# Patient Record
Sex: FEMALE | Race: WHITE | Employment: FULL TIME | URBAN - METROPOLITAN AREA
[De-identification: names, ages, dates, MRNs, and addresses within clinical notes are randomized per-mention and may not be internally consistent; named-entity substitution may affect disease eponyms.]

---

## 2017-10-13 ENCOUNTER — TELEPHONE (OUTPATIENT)
Dept: FAMILY MEDICINE CLINIC | Age: 67
End: 2017-10-13

## 2017-10-13 DIAGNOSIS — E78.5 HYPERLIPIDEMIA, UNSPECIFIED HYPERLIPIDEMIA TYPE: Primary | ICD-10-CM

## 2017-11-25 RX ORDER — CITALOPRAM 10 MG/1
TABLET ORAL
Qty: 30 TABLET | Refills: 0 | Status: SHIPPED | OUTPATIENT
Start: 2017-11-25 | End: 2017-11-27 | Stop reason: SDUPTHER

## 2017-11-27 ENCOUNTER — OFFICE VISIT (OUTPATIENT)
Dept: FAMILY MEDICINE CLINIC | Age: 67
End: 2017-11-27

## 2017-11-27 VITALS
OXYGEN SATURATION: 98 % | HEIGHT: 66 IN | WEIGHT: 161 LBS | HEART RATE: 75 BPM | SYSTOLIC BLOOD PRESSURE: 112 MMHG | BODY MASS INDEX: 25.88 KG/M2 | DIASTOLIC BLOOD PRESSURE: 66 MMHG

## 2017-11-27 DIAGNOSIS — Z23 NEED FOR PROPHYLACTIC VACCINATION WITH TETANUS-DIPHTHERIA (TD): ICD-10-CM

## 2017-11-27 DIAGNOSIS — Z00.00 ROUTINE GENERAL MEDICAL EXAMINATION AT A HEALTH CARE FACILITY: Primary | ICD-10-CM

## 2017-11-27 PROCEDURE — G0438 PPPS, INITIAL VISIT: HCPCS | Performed by: FAMILY MEDICINE

## 2017-11-27 RX ORDER — CITALOPRAM 10 MG/1
TABLET ORAL
Qty: 30 TABLET | Refills: 11 | Status: SHIPPED | OUTPATIENT
Start: 2017-11-27 | End: 2019-05-24 | Stop reason: SDUPTHER

## 2017-11-27 RX ORDER — TETANUS AND DIPHTHERIA TOXOIDS ADSORBED 2; 2 [LF]/.5ML; [LF]/.5ML
0.5 INJECTION INTRAMUSCULAR ONCE
Qty: 0.5 ML | Refills: 0 | Status: SHIPPED | OUTPATIENT
Start: 2017-11-27 | End: 2017-11-27

## 2017-11-27 ASSESSMENT — LIFESTYLE VARIABLES
HOW OFTEN DURING THE LAST YEAR HAVE YOU HAD A FEELING OF GUILT OR REMORSE AFTER DRINKING: 0
HOW OFTEN DO YOU HAVE A DRINK CONTAINING ALCOHOL: 1
HOW OFTEN DO YOU HAVE SIX OR MORE DRINKS ON ONE OCCASION: 1
HAVE YOU OR SOMEONE ELSE BEEN INJURED AS A RESULT OF YOUR DRINKING: 0
AUDIT-C TOTAL SCORE: 3
HAS A RELATIVE, FRIEND, DOCTOR, OR ANOTHER HEALTH PROFESSIONAL EXPRESSED CONCERN ABOUT YOUR DRINKING OR SUGGESTED YOU CUT DOWN: 0
HOW MANY STANDARD DRINKS CONTAINING ALCOHOL DO YOU HAVE ON A TYPICAL DAY: 1
HOW OFTEN DURING THE LAST YEAR HAVE YOU BEEN UNABLE TO REMEMBER WHAT HAPPENED THE NIGHT BEFORE BECAUSE YOU HAD BEEN DRINKING: 0
HOW OFTEN DURING THE LAST YEAR HAVE YOU NEEDED AN ALCOHOLIC DRINK FIRST THING IN THE MORNING TO GET YOURSELF GOING AFTER A NIGHT OF HEAVY DRINKING: 0
HOW OFTEN DURING THE LAST YEAR HAVE YOU FAILED TO DO WHAT WAS NORMALLY EXPECTED FROM YOU BECAUSE OF DRINKING: 0

## 2017-11-27 ASSESSMENT — PATIENT HEALTH QUESTIONNAIRE - PHQ9
1. LITTLE INTEREST OR PLEASURE IN DOING THINGS: 0
SUM OF ALL RESPONSES TO PHQ QUESTIONS 1-9: 0
2. FEELING DOWN, DEPRESSED OR HOPELESS: 0
SUM OF ALL RESPONSES TO PHQ9 QUESTIONS 1 & 2: 0

## 2017-11-27 ASSESSMENT — ANXIETY QUESTIONNAIRES: GAD7 TOTAL SCORE: 2

## 2017-11-27 NOTE — PROGRESS NOTES
Medicare Annual Wellness Visit  Name: Reynaldo Adame Date: 2017   MRN: [id number] Sex: Female   Age: 78 y.o. Ethnicity: Non-/Non    : 1950 Race: Ana Part is here for Medicare AWV    Screenings for behavioral, psychosocial and functional/safety risks, and cognitive dysfunction are all negative except as indicated below. These results, as well as other patient data from the [address] form, are documented in Flowsheets linked to this Encounter. Allergies   Allergen Reactions    Pneumococcal Vaccines Other (See Comments)     Large local reaction    Prednisone Palpitations     dizziness     Prior to Visit Medications    Medication Sig Taking? Authorizing Provider   citalopram (CELEXA) 10 MG tablet TAKE ONE TABLET BY MOUTH DAILY Yes Hanna Bradley MD     No past medical history on file. No past surgical history on file.   Family History   Problem Relation Age of Onset    High Cholesterol Mother     Other Father      colon polyps    Other Sister      gerd       CareTeam (Including outside providers/suppliers regularly involved in providing care):   Patient Care Team:  Hanna Bradley MD as PCP - General (Family Medicine)  Hanna Bradley MD as PCP - S Attributed Provider  Josh Bruce MD as Consulting Physician (Obstetrics & Gynecology)    Wt Readings from Last 3 Encounters:   17 161 lb (73 kg)   16 163 lb (73.9 kg)   07/08/15 156 lb (70.8 kg)     Vitals:    17 0734   BP: 112/66   Site: Right Arm   Position: Sitting   Cuff Size: Large Adult   Pulse: 75   SpO2: 98%   Weight: 161 lb (73 kg)   Height: 5' 5.75\" (1.67 m)       General Appearance: alert and oriented to person, place and time, well developed and well- nourished, in no acute distress  Head: normocephalic and atraumatic  Eyes: pupils equal, round, and reactive to light, extraocular eye movements intact, conjunctivae normal  ENT: tympanic membrane, external ear and ear canal normal bilaterally, nose without deformity, nasal mucosa and turbinates normal without polyps  Neck: supple and non-tender without mass, no thyromegaly or thyroid nodules, no cervical lymphadenopathy  Pulmonary/Chest: clear to auscultation bilaterally- no wheezes, rales or rhonchi, normal air movement, no respiratory distress  Cardiovascular: normal rate, regular rhythm, normal S1 and S2, no murmurs, rubs, clicks, or gallops, distal pulses intact, no carotid bruits  Abdomen: soft, non-tender, non-distended, normal bowel sounds, no masses or organomegaly  Extremities: no cyanosis, clubbing or edema  Musculoskeletal: normal range of motion, no joint swelling, deformity or tenderness  Neurologic: reflexes normal and symmetric, no cranial nerve deficit, gait, coordination and speech normal  Some freckles and SK  Patient's complete Health Risk Assessment and screening values have been reviewed and are found in Flowsheets. The following problems were reviewed today and where indicated follow up appointments were made and/or referrals ordered. Positive Risk Factor Screenings with Interventions:     General Health:  General  In general, how would you say your health is?: Very Good  In the past 7 days, have you experienced any of the following?: (!) Stress, work  Do you get the social and emotional support that you need?: Yes  Do you have a Living Will?: Yes  General Health Risk Interventions:  · None indicated    Health Habits/Nutrition:  Health Habits/Nutrition  Do you exercise for at least 20 minutes 2-3 times per week?: Yes  Have you lost any weight without trying in the past 3 months?: No  Do you eat fewer than 2 meals per day?: No  Have you seen a dentist within the past year?: (!) No  Body mass index is 26.18 kg/m².   Health Habits/Nutrition Interventions:  · None indicated      Personalized Preventive Plan   Current Health Maintenance Status  Immunization History   Administered Date(s) Administered    Influenza, High Dose 11/04/2016, 11/13/2017    Pneumococcal 13-valent Conjugate ([id number]) 11/04/2016    Zoster 01/03/2012        Health Maintenance   Topic Date Due    Hepatitis C screen  1950    DTaP/Tdap/Td vaccine (1 - Tdap) 02/11/1970    Colon cancer screen colonoscopy  01/01/2018    Breast cancer screen  03/16/2019    Lipid screen  11/16/2022    Zostavax vaccine  Completed    DEXA (modify frequency per FRAX score)  Completed    Flu vaccine  Completed     Recommendations for Preventive Services Due: see orders.   Recommended screening schedule for the next 5-10 years is provided to the patient in written form: see Patient Instructions/AVS.

## 2017-11-27 NOTE — PATIENT INSTRUCTIONS
Personalized Preventive Plan for Ary Loco - 11/27/2017  Medicare offers a range of preventive health benefits. Some of the tests and screenings are paid in full while other may be subject to a deductible, co-insurance, and/or copay. Some of these benefits include a comprehensive review of your medical history including lifestyle, illnesses that may run in your family, and various assessments and screenings as appropriate. After reviewing your medical record and screening and assessments performed today your provider may have ordered immunizations, labs, imaging, and/or referrals for you. A list of these orders (if applicable) as well as your Preventive Care list are included within your After Visit Summary for your review. Other Preventive Recommendations:    · A preventive eye exam performed by an eye specialist is recommended every 1-2 years to screen for glaucoma; cataracts, macular degeneration, and other eye disorders. · A preventive dental visit is recommended every 6 months. · Try to get at least 150 minutes of exercise per week or 10,000 steps per day on a pedometer . · Order or download the FREE \"Exercise & Physical Activity: Your Everyday Guide\" from [employer]. Call 8-118.476.3429 or search [employer] online. · You need 5108-2881 mg of calcium and 1305-7523 IU of vitamin D per day. It is possible to meet your calcium requirement with diet alone, but a vitamin D supplement is usually necessary to meet this goal.  · When exposed to the sun, use a sunscreen that protects against both UVA and UVB radiation with an SPF of 30 or greater. Reapply every 2 to 3 hours or after sweating, drying off with a towel, or swimming. · Always wear a seat belt when traveling in a car. Always wear a helmet when riding a bicycle or motorcycle.

## 2018-09-20 ENCOUNTER — TELEPHONE (OUTPATIENT)
Dept: FAMILY MEDICINE CLINIC | Age: 68
End: 2018-09-20

## 2018-09-25 ENCOUNTER — TELEPHONE (OUTPATIENT)
Dept: FAMILY MEDICINE CLINIC | Age: 68
End: 2018-09-25

## 2018-10-23 ENCOUNTER — TELEPHONE (OUTPATIENT)
Dept: FAMILY MEDICINE CLINIC | Age: 68
End: 2018-10-23

## 2018-10-23 DIAGNOSIS — E78.5 HYPERLIPIDEMIA, UNSPECIFIED HYPERLIPIDEMIA TYPE: Primary | ICD-10-CM

## 2018-11-28 ENCOUNTER — OFFICE VISIT (OUTPATIENT)
Dept: FAMILY MEDICINE CLINIC | Age: 68
End: 2018-11-28
Payer: COMMERCIAL

## 2018-11-28 VITALS
WEIGHT: 162 LBS | SYSTOLIC BLOOD PRESSURE: 120 MMHG | DIASTOLIC BLOOD PRESSURE: 70 MMHG | HEIGHT: 66 IN | BODY MASS INDEX: 26.03 KG/M2

## 2018-11-28 DIAGNOSIS — F41.9 ANXIETY: Primary | ICD-10-CM

## 2018-11-28 DIAGNOSIS — E78.00 PURE HYPERCHOLESTEROLEMIA: ICD-10-CM

## 2018-11-28 DIAGNOSIS — Z00.00 PHYSICAL EXAM: ICD-10-CM

## 2018-11-28 PROCEDURE — 99397 PER PM REEVAL EST PAT 65+ YR: CPT | Performed by: FAMILY MEDICINE

## 2018-11-28 RX ORDER — CITALOPRAM 10 MG/1
10 TABLET ORAL DAILY
Qty: 30 TABLET | Refills: 5 | Status: SHIPPED | OUTPATIENT
Start: 2018-11-28 | End: 2019-05-24

## 2018-11-28 ASSESSMENT — ENCOUNTER SYMPTOMS
RHINORRHEA: 0
WHEEZING: 0
NAUSEA: 0
TROUBLE SWALLOWING: 0
VOMITING: 0
ABDOMINAL PAIN: 0
SHORTNESS OF BREATH: 0
CONSTIPATION: 1
COUGH: 0
DIARRHEA: 0
CHEST TIGHTNESS: 0
BACK PAIN: 1
EYE PAIN: 0

## 2018-11-28 ASSESSMENT — PATIENT HEALTH QUESTIONNAIRE - PHQ9
SUM OF ALL RESPONSES TO PHQ QUESTIONS 1-9: 0
2. FEELING DOWN, DEPRESSED OR HOPELESS: 0
SUM OF ALL RESPONSES TO PHQ9 QUESTIONS 1 & 2: 0
1. LITTLE INTEREST OR PLEASURE IN DOING THINGS: 0
SUM OF ALL RESPONSES TO PHQ QUESTIONS 1-9: 0

## 2019-03-25 NOTE — PROGRESS NOTES
Patient not reached. Preop instructions left on voice mail. Number__[phone]_______      DATE__4/4/19______ TIME___0830_____ARRIVAL__FEC  0700_______      Nothing to eat or drink after midnight the night before,except for what the prep instructions call for. If you do not have the instructions or do not understand them please contact your doctors office. Follow any instructions your doctors office has given you including what medications to take the AM of your procedure and which ones to hold. You may use your inhalers. If you take a long acting insulin the kaveh prior please cut the dose in half and take no diabetic medications that AM.Follow specific doctors office instructions regarding blood thinners and if they want you to hold and for how long. If you are on a blood thinner and have no instructions please contact the office and ask. Dress comfortably,bring your insurance card,picture ID,and a complete list of medications, including supplements. You must have a responsible adult to stay with you during the procedure,drive you home and stay with you. [facility] phone number [phone] for any questions.

## 2019-03-26 ENCOUNTER — ANESTHESIA EVENT (OUTPATIENT)
Dept: ENDOSCOPY | Age: 69
End: 2019-03-26
Payer: MEDICARE

## 2019-04-04 ENCOUNTER — ANESTHESIA (OUTPATIENT)
Dept: ENDOSCOPY | Age: 69
End: 2019-04-04
Payer: MEDICARE

## 2019-04-04 ENCOUNTER — HOSPITAL ENCOUNTER (OUTPATIENT)
Age: 69
Setting detail: OUTPATIENT SURGERY
Discharge: HOME | End: 2019-04-04
Attending: INTERNAL MEDICINE | Admitting: INTERNAL MEDICINE
Payer: MEDICARE

## 2019-04-04 VITALS
DIASTOLIC BLOOD PRESSURE: 77 MMHG | HEART RATE: 64 BPM | TEMPERATURE: 97.4 F | SYSTOLIC BLOOD PRESSURE: 129 MMHG | HEIGHT: 66 IN | OXYGEN SATURATION: 100 % | RESPIRATION RATE: 16 BRPM | WEIGHT: 160 LBS | BODY MASS INDEX: 25.71 KG/M2

## 2019-04-04 VITALS — OXYGEN SATURATION: 99 % | DIASTOLIC BLOOD PRESSURE: 63 MMHG | SYSTOLIC BLOOD PRESSURE: 94 MMHG

## 2019-04-04 PROCEDURE — 2500000003 HC RX 250 WO HCPCS: Performed by: NURSE ANESTHETIST, CERTIFIED REGISTERED

## 2019-04-04 PROCEDURE — 2709999900 HC NON-CHARGEABLE SUPPLY: Performed by: INTERNAL MEDICINE

## 2019-04-04 PROCEDURE — 7100000010 HC PHASE II RECOVERY - FIRST 15 MIN: Performed by: INTERNAL MEDICINE

## 2019-04-04 PROCEDURE — 6360000002 HC RX W HCPCS: Performed by: NURSE ANESTHETIST, CERTIFIED REGISTERED

## 2019-04-04 PROCEDURE — 3700000001 HC ADD 15 MINUTES (ANESTHESIA): Performed by: INTERNAL MEDICINE

## 2019-04-04 PROCEDURE — 2580000003 HC RX 258: Performed by: ANESTHESIOLOGY

## 2019-04-04 PROCEDURE — 3609027000 HC COLONOSCOPY: Performed by: INTERNAL MEDICINE

## 2019-04-04 PROCEDURE — 7100000011 HC PHASE II RECOVERY - ADDTL 15 MIN: Performed by: INTERNAL MEDICINE

## 2019-04-04 PROCEDURE — 3700000000 HC ANESTHESIA ATTENDED CARE: Performed by: INTERNAL MEDICINE

## 2019-04-04 RX ORDER — LIDOCAINE HYDROCHLORIDE 20 MG/ML
INJECTION, SOLUTION EPIDURAL; INFILTRATION; INTRACAUDAL; PERINEURAL PRN
Status: DISCONTINUED | OUTPATIENT
Start: 2019-04-04 | End: 2019-04-04 | Stop reason: SDUPTHER

## 2019-04-04 RX ORDER — PROPOFOL 10 MG/ML
INJECTION, EMULSION INTRAVENOUS PRN
Status: DISCONTINUED | OUTPATIENT
Start: 2019-04-04 | End: 2019-04-04 | Stop reason: SDUPTHER

## 2019-04-04 RX ORDER — SODIUM CHLORIDE 9 MG/ML
INJECTION, SOLUTION INTRAVENOUS CONTINUOUS
Status: DISCONTINUED | OUTPATIENT
Start: 2019-04-04 | End: 2019-04-04 | Stop reason: HOSPADM

## 2019-04-04 RX ORDER — SODIUM CHLORIDE 0.9 % (FLUSH) 0.9 %
10 SYRINGE (ML) INJECTION EVERY 12 HOURS SCHEDULED
Status: DISCONTINUED | OUTPATIENT
Start: 2019-04-04 | End: 2019-04-04 | Stop reason: HOSPADM

## 2019-04-04 RX ORDER — LIDOCAINE HYDROCHLORIDE 10 MG/ML
1 INJECTION, SOLUTION EPIDURAL; INFILTRATION; INTRACAUDAL; PERINEURAL
Status: DISCONTINUED | OUTPATIENT
Start: 2019-04-04 | End: 2019-04-04 | Stop reason: HOSPADM

## 2019-04-04 RX ORDER — SODIUM CHLORIDE 0.9 % (FLUSH) 0.9 %
10 SYRINGE (ML) INJECTION PRN
Status: DISCONTINUED | OUTPATIENT
Start: 2019-04-04 | End: 2019-04-04 | Stop reason: HOSPADM

## 2019-04-04 RX ADMIN — PROPOFOL 20 MG: 10 INJECTION, EMULSION INTRAVENOUS at 08:49

## 2019-04-04 RX ADMIN — LIDOCAINE HYDROCHLORIDE 50 MG: 20 INJECTION, SOLUTION EPIDURAL; INFILTRATION; INTRACAUDAL; PERINEURAL at 08:43

## 2019-04-04 RX ADMIN — PROPOFOL 20 MG: 10 INJECTION, EMULSION INTRAVENOUS at 08:45

## 2019-04-04 RX ADMIN — PROPOFOL 20 MG: 10 INJECTION, EMULSION INTRAVENOUS at 08:51

## 2019-04-04 RX ADMIN — PROPOFOL 20 MG: 10 INJECTION, EMULSION INTRAVENOUS at 08:47

## 2019-04-04 RX ADMIN — PROPOFOL 100 MG: 10 INJECTION, EMULSION INTRAVENOUS at 08:43

## 2019-04-04 RX ADMIN — SODIUM CHLORIDE: 9 INJECTION, SOLUTION INTRAVENOUS at 07:52

## 2019-04-04 RX ADMIN — PROPOFOL 20 MG: 10 INJECTION, EMULSION INTRAVENOUS at 08:57

## 2019-04-04 RX ADMIN — PROPOFOL 20 MG: 10 INJECTION, EMULSION INTRAVENOUS at 08:54

## 2019-04-04 ASSESSMENT — ENCOUNTER SYMPTOMS: SHORTNESS OF BREATH: 0

## 2019-04-04 ASSESSMENT — PAIN - FUNCTIONAL ASSESSMENT: PAIN_FUNCTIONAL_ASSESSMENT: 0-10

## 2019-04-04 ASSESSMENT — PAIN SCALES - GENERAL
PAINLEVEL_OUTOF10: 0

## 2019-04-04 NOTE — PROGRESS NOTES
Name:  Lisa Hernandez  Age:  77 y.o.  CSN:  [id number]            Past Medical History:        Diagnosis Date    Anxiety     Hyperlipidemia        Past Surgical History:  History reviewed. No pertinent surgical history. Medications Prior to Admission:  Medications Prior to Admission: citalopram (CELEXA) 10 MG tablet, TAKE ONE TABLET BY MOUTH DAILY  citalopram (CELEXA) 10 MG tablet, Take 1 tablet by mouth daily    Allergies:  Pneumococcal vaccines and Prednisone    Social History:  Social History     Socioeconomic History    Marital status: Single     Spouse name: Not on file    Number of children: Not on file    Years of education: Not on file    Highest education level: Not on file   Occupational History    Not on file   Social Needs    Financial resource strain: Not on file    Food insecurity:     Worry: Not on file     Inability: Not on file    Transportation needs:     Medical: Not on file     Non-medical: Not on file   Tobacco Use    Smoking status: Former Smoker    Smokeless tobacco: Never Used   Substance and Sexual Activity    Alcohol use:  Yes     Alcohol/week: 0.0 oz     Comment: occasional    Drug use: No    Sexual activity: Not Currently   Lifestyle    Physical activity:     Days per week: Not on file     Minutes per session: Not on file    Stress: Not on file   Relationships    Social connections:     Talks on phone: Not on file     Gets together: Not on file     Attends Buddhism service: Not on file     Active member of club or organization: Not on file     Attends meetings of clubs or organizations: Not on file     Relationship status: Not on file    Intimate partner violence:     Fear of current or ex partner: Not on file     Emotionally abused: Not on file     Physically abused: Not on file     Forced sexual activity: Not on file   Other Topics Concern    Not on file   Social History Narrative    Not on file       Family History:      Problem Relation Age of Onset    High Cholesterol Mother     Other Father         colon polyps    Colon Cancer Father     Other Sister         gerd       Vital Signs:  Vitals:    04/04/19 0739   BP: 129/81   Pulse: 71   Resp: 16   Temp: 98.4 °F (36.9 °C)   SpO2: 98%

## 2019-04-04 NOTE — PROGRESS NOTES
Reviewed patient's medical and surgical history in electronic record and with patient at the bedside. All questions regarding procedure answered. Scope number and equipment verified using a two person system. Family in waiting room.     Electronically signed by Annette Marroquin RN on 4/4/2019 at 8:41 AM

## 2019-04-04 NOTE — ANESTHESIA PRE PROCEDURE
16   Temp: 98.4 °F (36.9 °C)   TempSrc: Temporal   SpO2: 98%   Weight: 160 lb (72.6 kg)   Height: 5' 6\" (1.676 m)                                              BP Readings from Last 3 Encounters:   04/04/19 129/81   11/28/18 120/70   11/27/17 112/66       NPO Status: Time of last liquid consumption: 0330                        Time of last solid consumption: 1800                        Date of last liquid consumption: 04/04/19(prep)                        Date of last solid food consumption: 04/02/19    BMI:   Wt Readings from Last 3 Encounters:   04/04/19 160 lb (72.6 kg)   11/28/18 162 lb (73.5 kg)   11/27/17 161 lb (73 kg)     Body mass index is 25.82 kg/m². CBC: No results found for: WBC, RBC, HGB, HCT, MCV, RDW, PLT    CMP:   Lab Results   Component Value Date     11/21/2018    K 4.2 11/21/2018     11/21/2018    CO2 28 11/21/2018    BUN 20 11/21/2018    CREATININE 0.68 11/21/2018    GFRAA 105 11/21/2018    AGRATIO 1.9 11/21/2018    LABGLOM 91 11/21/2018    GLUCOSE 84 11/21/2018    PROT 6.6 11/21/2018    CALCIUM 9.2 11/21/2018    BILITOT 0.6 11/21/2018    ALKPHOS 63 11/21/2018    AST 15 11/21/2018    ALT 12 11/21/2018       POC Tests: No results for input(s): POCGLU, POCNA, POCK, POCCL, POCBUN, POCHEMO, POCHCT in the last 72 hours.     Coags: No results found for: PROTIME, INR, APTT    HCG (If Applicable): No results found for: PREGTESTUR, PREGSERUM, HCG, HCGQUANT     ABGs: No results found for: PHART, PO2ART, BUZ5GFF, MWR8VCE, BEART, C3WRQDCV     Type & Screen (If Applicable):  No results found for: LABABO, [address]    Anesthesia Evaluation  Patient summary reviewed and Nursing notes reviewed no history of anesthetic complications:   Airway: Mallampati: II  TM distance: >3 FB   Neck ROM: full  Mouth opening: > = 3 FB Dental: normal exam         Pulmonary:       (-) asthma and shortness of breath                           Cardiovascular:  Exercise tolerance: good (>4 METS),   (+) hyperlipidemia    (-) hypertension and  angina                Neuro/Psych:               GI/Hepatic/Renal:        (-) GERD       Endo/Other:        (-) diabetes mellitus               Abdominal:           Vascular:                                      Anesthesia Plan      MAC     ASA 1       Induction: intravenous. Anesthetic plan and risks discussed with patient. Plan discussed with CRNA.                   Farideh Hammonds MD   4/4/2019

## 2019-05-24 ENCOUNTER — OFFICE VISIT (OUTPATIENT)
Dept: FAMILY MEDICINE CLINIC | Age: 69
End: 2019-05-24
Payer: MEDICARE

## 2019-05-24 VITALS
BODY MASS INDEX: 26.47 KG/M2 | OXYGEN SATURATION: 98 % | SYSTOLIC BLOOD PRESSURE: 110 MMHG | WEIGHT: 164 LBS | HEART RATE: 78 BPM | DIASTOLIC BLOOD PRESSURE: 70 MMHG

## 2019-05-24 DIAGNOSIS — G89.29 CHRONIC LEFT-SIDED LOW BACK PAIN WITH LEFT-SIDED SCIATICA: ICD-10-CM

## 2019-05-24 DIAGNOSIS — E78.2 MIXED HYPERLIPIDEMIA: ICD-10-CM

## 2019-05-24 DIAGNOSIS — M54.42 CHRONIC LEFT-SIDED LOW BACK PAIN WITH LEFT-SIDED SCIATICA: ICD-10-CM

## 2019-05-24 DIAGNOSIS — F41.9 ANXIETY: Primary | ICD-10-CM

## 2019-05-24 PROBLEM — E78.5 HYPERLIPIDEMIA: Status: ACTIVE | Noted: 2019-05-24

## 2019-05-24 PROCEDURE — 99214 OFFICE O/P EST MOD 30 MIN: CPT | Performed by: FAMILY MEDICINE

## 2019-05-24 RX ORDER — CITALOPRAM 10 MG/1
TABLET ORAL
Qty: 30 TABLET | Refills: 11 | Status: SHIPPED | OUTPATIENT
Start: 2019-05-24 | End: 2019-12-16 | Stop reason: SDUPTHER

## 2019-05-24 ASSESSMENT — ENCOUNTER SYMPTOMS
DIARRHEA: 0
RHINORRHEA: 1
CHEST TIGHTNESS: 0
EYE ITCHING: 1
WHEEZING: 1
CONSTIPATION: 1
SHORTNESS OF BREATH: 0
BACK PAIN: 1

## 2019-05-24 ASSESSMENT — PATIENT HEALTH QUESTIONNAIRE - PHQ9
SUM OF ALL RESPONSES TO PHQ9 QUESTIONS 1 & 2: 0
SUM OF ALL RESPONSES TO PHQ QUESTIONS 1-9: 0
2. FEELING DOWN, DEPRESSED OR HOPELESS: 0
1. LITTLE INTEREST OR PLEASURE IN DOING THINGS: 0
SUM OF ALL RESPONSES TO PHQ QUESTIONS 1-9: 0

## 2019-05-24 NOTE — PROGRESS NOTES
SUBJECTIVE:    Brenda Matias is a 77 y.o. female who presents for a follow up visit. Chief Complaint   Patient presents with    Follow-up     Patient is here for a follow up, discuss labs. Hyperlipidemia   This is a chronic problem. The current episode started more than 1 year ago. The problem is uncontrolled. Lipid results: Total Chol 241, HDL 60, Trig 166, . Factors aggravating her hyperlipidemia include fatty foods. Pertinent negatives include no chest pain or shortness of breath. She is currently on no antihyperlipidemic treatment. Back Pain   This is a recurrent problem. Episode onset: recent flare for last 2 months. The problem occurs intermittently. The problem has been gradually improving since onset. The pain is present in the sacro-iliac. The quality of the pain is described as aching. The pain radiates to the left thigh and left knee. The pain is mild. The symptoms are aggravated by standing. Pertinent negatives include no chest pain or headaches. Risk factors include lack of exercise and sedentary lifestyle. She has tried walking and home exercises for the symptoms. The treatment provided mild relief. Anxiety  This is a chronic problem. Patient states symptoms have been under good control with the use of Celexa 10 mg daily. She is happy with this dose and denies side effects and wants to continue. Patient's medications, allergies, past medical,surgical, social and family histories were reviewed and updated as appropriate.      Past Medical History:   Diagnosis Date    Anxiety     Hyperlipidemia      Past Surgical History:   Procedure Laterality Date    COLONOSCOPY N/A 4/4/2019    COLONOSCOPY performed by Jaye Stewart MD at [address] [place] [address]     Family History   Problem Relation Age of Onset    High Cholesterol Mother     Other Father         colon polyps    Colon Cancer Father     Other Sister         gerd     Social History     Socioeconomic History    Marital status: Single     Spouse name: Not on file    Number of children: Not on file    Years of education: Not on file    Highest education level: Not on file   Occupational History    Not on file   Social Needs    Financial resource strain: Not on file    Food insecurity:     Worry: Not on file     Inability: Not on file    Transportation needs:     Medical: Not on file     Non-medical: Not on file   Tobacco Use    Smoking status: Former Smoker     Packs/day: 1.00     Years: 10.00     Pack years: 10.00    Smokeless tobacco: Never Used   Substance and Sexual Activity    Alcohol use: Yes     Alcohol/week: 0.0 oz     Comment: occasional    Drug use: No    Sexual activity: Not Currently   Lifestyle    Physical activity:     Days per week: Not on file     Minutes per session: Not on file    Stress: Not on file   Relationships    Social connections:     Talks on phone: Not on file     Gets together: Not on file     Attends Anabaptist service: Not on file     Active member of club or organization: Not on file     Attends meetings of clubs or organizations: Not on file     Relationship status: Not on file    Intimate partner violence:     Fear of current or ex partner: Not on file     Emotionally abused: Not on file     Physically abused: Not on file     Forced sexual activity: Not on file   Other Topics Concern    Not on file   Social History Narrative    Not on file     Allergies   Allergen Reactions    Pneumococcal Vaccines Other (See Comments)     Large local reaction    Prednisone Palpitations     dizziness     Current Outpatient Medications   Medication Sig Dispense Refill    citalopram (CELEXA) 10 MG tablet TAKE ONE TABLET BY MOUTH DAILY 30 tablet 11     No current facility-administered medications for this visit. Review of Systems   Constitutional: Negative for activity change, fatigue and unexpected weight change. HENT: Positive for postnasal drip and rhinorrhea.     Eyes: Positive for itching. Respiratory: Positive for wheezing. Negative for chest tightness and shortness of breath. Cardiovascular: Positive for leg swelling. Negative for chest pain and palpitations. Gastrointestinal: Positive for constipation (occasional). Negative for diarrhea. Genitourinary: Negative for difficulty urinating. Musculoskeletal: Positive for back pain. Neurological: Negative for dizziness, light-headedness and headaches. Psychiatric/Behavioral: The patient is nervous/anxious (stable). OBJECTIVE:    /70   Pulse 78   Wt 164 lb (74.4 kg)   SpO2 98%   BMI 26.47 kg/m²    Physical Exam   Constitutional: She is oriented to person, place, and time. She appears well-developed and well-nourished. HENT:   Head: Normocephalic and atraumatic. Right Ear: External ear normal.   Left Ear: External ear normal.   Nose: Nose normal.   Mouth/Throat: Oropharynx is clear and moist.   Eyes: Conjunctivae and EOM are normal. Right eye exhibits no discharge. Neck: Normal range of motion. Neck supple. No JVD present. No tracheal deviation present. No thyromegaly present. Cardiovascular: Normal rate, regular rhythm and normal heart sounds. Pulmonary/Chest: Effort normal and breath sounds normal. No respiratory distress. She has no rales. Lymphadenopathy:     She has no cervical adenopathy. Neurological: She is alert and oriented to person, place, and time. Skin: Skin is warm and dry. Psychiatric: She has a normal mood and affect. ASSESSMENT/PLAN:    [facility]. Diagnoses and all orders for this visit:    Anxiety  -     citalopram (CELEXA) 10 MG tablet; TAKE ONE TABLET BY MOUTH DAILY    Mixed hyperlipidemia  We discussed her abnormal lipids. She does have a family history of disease. Other than that there is no other risk factor for coronary artery disease or peripheral vascular disease. She smoked but quit over 30 years ago.   It was only for a brief period of time. I suggested she obtain a coronary calcium score. She is given the form for [employer] with their number. If her score is elevated will consider starting a statin. Chronic left-sided low back pain with left-sided sciatica  Stable at this time back exercises are given via handout. Patient will return sooner than her yearly follow-up if she has a high coronary calcium score. Return in about 1 year (around 5/24/2020). Please note portions of this note were completed with a voicerecognition program.  Efforts were made to edit the dictations but occasionally words are mis-transcribed.

## 2019-06-03 RX ORDER — PRAVASTATIN SODIUM 40 MG
40 TABLET ORAL DAILY
Qty: 90 TABLET | Refills: 1 | Status: SHIPPED | OUTPATIENT
Start: 2019-06-03 | End: 2019-12-05 | Stop reason: SDUPTHER

## 2019-12-10 ENCOUNTER — HOSPITAL ENCOUNTER (OUTPATIENT)
Age: 69
Discharge: HOME | End: 2019-12-10
Payer: MEDICARE

## 2019-12-10 DIAGNOSIS — E78.2 MIXED HYPERLIPIDEMIA: ICD-10-CM

## 2019-12-10 LAB
A/G RATIO: 1.5 (ref 1.1–2.2)
ALBUMIN SERPL-MCNC: 4.2 G/DL (ref 3.4–5)
ALP BLD-CCNC: 68 U/L (ref 40–129)
ALT SERPL-CCNC: 16 U/L (ref 10–40)
ANION GAP SERPL CALCULATED.3IONS-SCNC: 15 MMOL/L (ref 3–16)
AST SERPL-CCNC: 18 U/L (ref 15–37)
BILIRUB SERPL-MCNC: <0.2 MG/DL (ref 0–1)
BUN BLDV-MCNC: 18 MG/DL (ref 7–20)
CALCIUM SERPL-MCNC: 8.9 MG/DL (ref 8.3–10.6)
CHLORIDE BLD-SCNC: 104 MMOL/L (ref 99–110)
CHOLESTEROL, TOTAL: 181 MG/DL (ref 0–199)
CO2: 23 MMOL/L (ref 21–32)
CREAT SERPL-MCNC: 0.6 MG/DL (ref 0.6–1.2)
GFR AFRICAN AMERICAN: >60
GFR NON-AFRICAN AMERICAN: >60
GLOBULIN: 2.8 G/DL
GLUCOSE BLD-MCNC: 87 MG/DL (ref 70–99)
HDLC SERPL-MCNC: 59 MG/DL (ref 40–60)
LDL CHOLESTEROL CALCULATED: 93 MG/DL
POTASSIUM SERPL-SCNC: 4.7 MMOL/L (ref 3.5–5.1)
SODIUM BLD-SCNC: 142 MMOL/L (ref 136–145)
TOTAL PROTEIN: 7 G/DL (ref 6.4–8.2)
TRIGL SERPL-MCNC: 146 MG/DL (ref 0–150)
VLDLC SERPL CALC-MCNC: 29 MG/DL

## 2019-12-10 PROCEDURE — 80061 LIPID PANEL: CPT

## 2019-12-10 PROCEDURE — 80053 COMPREHEN METABOLIC PANEL: CPT

## 2019-12-10 PROCEDURE — 36415 COLL VENOUS BLD VENIPUNCTURE: CPT

## 2019-12-16 ENCOUNTER — OFFICE VISIT (OUTPATIENT)
Dept: FAMILY MEDICINE CLINIC | Age: 69
End: 2019-12-16
Payer: MEDICARE

## 2019-12-16 VITALS
OXYGEN SATURATION: 98 % | HEART RATE: 73 BPM | WEIGHT: 170.4 LBS | DIASTOLIC BLOOD PRESSURE: 70 MMHG | SYSTOLIC BLOOD PRESSURE: 110 MMHG | BODY MASS INDEX: 27.5 KG/M2

## 2019-12-16 DIAGNOSIS — F41.9 ANXIETY: ICD-10-CM

## 2019-12-16 DIAGNOSIS — E78.2 MIXED HYPERLIPIDEMIA: ICD-10-CM

## 2019-12-16 PROCEDURE — 99213 OFFICE O/P EST LOW 20 MIN: CPT | Performed by: FAMILY MEDICINE

## 2019-12-16 PROCEDURE — 3288F FALL RISK ASSESSMENT DOCD: CPT | Performed by: FAMILY MEDICINE

## 2019-12-16 RX ORDER — PRAVASTATIN SODIUM 40 MG
TABLET ORAL
Qty: 90 TABLET | Refills: 3 | Status: SHIPPED | OUTPATIENT
Start: 2019-12-16 | End: 2020-12-17 | Stop reason: SDUPTHER

## 2019-12-16 RX ORDER — CITALOPRAM 10 MG/1
TABLET ORAL
Qty: 90 TABLET | Refills: 3 | Status: SHIPPED | OUTPATIENT
Start: 2019-12-16 | End: 2020-12-17

## 2019-12-16 ASSESSMENT — ENCOUNTER SYMPTOMS
DIARRHEA: 0
CONSTIPATION: 1
SHORTNESS OF BREATH: 0

## 2020-07-21 ENCOUNTER — APPOINTMENT (OUTPATIENT)
Dept: CT IMAGING | Age: 70
End: 2020-07-21
Payer: MEDICARE

## 2020-07-21 ENCOUNTER — APPOINTMENT (OUTPATIENT)
Dept: GENERAL RADIOLOGY | Age: 70
End: 2020-07-21
Payer: MEDICARE

## 2020-07-21 ENCOUNTER — HOSPITAL ENCOUNTER (EMERGENCY)
Age: 70
Discharge: HOME | End: 2020-07-22
Payer: MEDICARE

## 2020-07-21 VITALS
OXYGEN SATURATION: 100 % | HEIGHT: 67 IN | SYSTOLIC BLOOD PRESSURE: 162 MMHG | TEMPERATURE: 98.4 F | WEIGHT: 160 LBS | HEART RATE: 77 BPM | BODY MASS INDEX: 25.11 KG/M2 | RESPIRATION RATE: 16 BRPM | DIASTOLIC BLOOD PRESSURE: 89 MMHG

## 2020-07-21 PROCEDURE — 73110 X-RAY EXAM OF WRIST: CPT

## 2020-07-21 PROCEDURE — 99284 EMERGENCY DEPT VISIT MOD MDM: CPT

## 2020-07-21 PROCEDURE — 70450 CT HEAD/BRAIN W/O DYE: CPT

## 2020-07-21 PROCEDURE — 73130 X-RAY EXAM OF HAND: CPT

## 2020-07-21 PROCEDURE — 70486 CT MAXILLOFACIAL W/O DYE: CPT

## 2020-07-21 PROCEDURE — 72125 CT NECK SPINE W/O DYE: CPT

## 2020-07-21 PROCEDURE — 6370000000 HC RX 637 (ALT 250 FOR IP): Performed by: PHYSICIAN ASSISTANT

## 2020-07-21 RX ORDER — HYDROCODONE BITARTRATE AND ACETAMINOPHEN 5; 325 MG/1; MG/1
1 TABLET ORAL ONCE
Status: COMPLETED | OUTPATIENT
Start: 2020-07-21 | End: 2020-07-21

## 2020-07-21 RX ORDER — HYDROCODONE BITARTRATE AND ACETAMINOPHEN 5; 325 MG/1; MG/1
1 TABLET ORAL EVERY 6 HOURS PRN
Qty: 10 TABLET | Refills: 0 | Status: SHIPPED | OUTPATIENT
Start: 2020-07-21 | End: 2020-07-24

## 2020-07-21 RX ADMIN — HYDROCODONE BITARTRATE AND ACETAMINOPHEN 1 TABLET: 5; 325 TABLET ORAL at 23:05

## 2020-07-21 ASSESSMENT — ENCOUNTER SYMPTOMS
ABDOMINAL PAIN: 0
COUGH: 0
RHINORRHEA: 0
SHORTNESS OF BREATH: 0
DIARRHEA: 0
NAUSEA: 0
VOMITING: 0
WHEEZING: 0

## 2020-07-21 ASSESSMENT — PAIN SCALES - GENERAL
PAINLEVEL_OUTOF10: 6
PAINLEVEL_OUTOF10: 8

## 2020-07-22 NOTE — ED PROVIDER NOTES
nausea and vomiting. Genitourinary: Negative for difficulty urinating, dysuria and hematuria. Musculoskeletal: Positive for arthralgias (L hand, R wrist injury). Negative for neck pain and neck stiffness. Skin: Negative for rash. Neurological: Positive for headaches. Negative for dizziness, syncope, weakness and light-headedness. Positives and Pertinent negatives as per HPI. Except as noted above in the ROS, all other systems were reviewed and negative. PAST MEDICAL HISTORY     Past Medical History:   Diagnosis Date    Anxiety     Hyperlipidemia          SURGICAL HISTORY     Past Surgical History:   Procedure Laterality Date    COLONOSCOPY N/A 2019    COLONOSCOPY performed by Brandan Babin MD at [address]       Previous Medications    CITALOPRAM (CELEXA) 10 MG TABLET    TAKE ONE TABLET BY MOUTH DAILY    PRAVASTATIN (PRAVACHOL) 40 MG TABLET    TAKE ONE TABLET BY MOUTH DAILY         ALLERGIES     Pneumococcal vaccines and Prednisone    FAMILYHISTORY       Family History   Problem Relation Age of Onset    High Cholesterol Mother     Other Father         colon polyps    Colon Cancer Father     Other Sister         gerd          SOCIAL HISTORY       Social History     Tobacco Use    Smoking status: Former Smoker     Packs/day: 1.00     Years: 10.00     Pack years: 10.00     Last attempt to quit: 1983     Years since quittin.6    Smokeless tobacco: Never Used   Substance Use Topics    Alcohol use: Yes     Alcohol/week: 0.0 standard drinks     Comment: occasional    Drug use: No       SCREENINGS             PHYSICAL EXAM    (up to 7 for level 4, 8 or more for level 5)     ED Triage Vitals [20]   BP Temp Temp Source Pulse Resp SpO2 Height Weight   (!) 162/89 98.4 °F (36.9 °C) Oral 77 16 100 % 5' 6.5\" (1.689 m) 160 lb (72.6 kg)       Physical Exam  Vitals signs and nursing note reviewed.    Constitutional:       General: She is not in acute distress. Appearance: She is well-developed. She is not ill-appearing, toxic-appearing or diaphoretic. HENT:      Head: Normocephalic. Contusion present. Right Ear: External ear normal.      Left Ear: External ear normal.      Nose: Nose normal.   Eyes:      General:         Right eye: No discharge. Left eye: No discharge. Extraocular Movements: Extraocular movements intact. Conjunctiva/sclera: Conjunctivae normal.      Pupils: Pupils are equal, round, and reactive to light. Neck:      Musculoskeletal: Normal range of motion and neck supple. No neck rigidity or muscular tenderness. Cardiovascular:      Rate and Rhythm: Normal rate and regular rhythm. Pulses: Normal pulses. Heart sounds: Normal heart sounds. Pulmonary:      Effort: Pulmonary effort is normal. No respiratory distress. Breath sounds: Normal breath sounds. Chest:      Chest wall: No tenderness. Abdominal:      General: There is no distension. Palpations: Abdomen is soft. Tenderness: There is no abdominal tenderness. Musculoskeletal:      Right wrist: She exhibits decreased range of motion, tenderness and swelling. Left hand: She exhibits tenderness and swelling. Hands:    Lymphadenopathy:      Cervical: No cervical adenopathy. Skin:     General: Skin is warm and dry. Neurological:      Mental Status: She is alert and oriented to person, place, and time. Mental status is at baseline. GCS: GCS eye subscore is 4. GCS verbal subscore is 5. GCS motor subscore is 6. Cranial Nerves: Cranial nerves are intact. Sensory: Sensation is intact. Motor: Motor function is intact. Psychiatric:         Behavior: Behavior normal.         DIAGNOSTIC RESULTS   LABS:    Labs Reviewed - No data to display    All other labs were within normal range or not returned as of this dictation. EKG:  All EKG's are interpreted by the Emergency Department Physician in the absence of a cardiologist.  Please see their note for interpretation of EKG. RADIOLOGY:   Non-plain film images such as CT, Ultrasound and MRI are read by the radiologist. Plain radiographic images are visualized and preliminarily interpreted by the ED Provider with the below findings:        Interpretation per the Radiologist below, if available at the time of this note:    XR HAND LEFT (MIN 3 VIEWS)   Final Result   Comminuted, intra-articular fracture at the base 1st metacarpal.         CT FACIAL BONES WO CONTRAST   Final Result   1. No facial bone fracture. 2. Right-sided periorbital preseptal soft tissue swelling with right frontal   scalp hematoma. 3. No acute intracranial abnormality. XR WRIST RIGHT (MIN 3 VIEWS)   Final Result   Comminuted intra-articular fracture of the distal radial metaphysis. Soft tissue swelling. XR HAND RIGHT (MIN 3 VIEWS)   Final Result   Comminuted intra-articular fracture of the distal radial metaphysis with   associated soft tissue swelling. CT CERVICAL SPINE WO CONTRAST   Final Result   No acute abnormality of the cervical spine. CT HEAD WO CONTRAST   Final Result   1. No facial bone fracture. 2. Right-sided periorbital preseptal soft tissue swelling with right frontal   scalp hematoma. 3. No acute intracranial abnormality. Xr Wrist Right (min 3 Views)    Result Date: 7/21/2020  EXAMINATION: 3 XRAY VIEWS OF THE RIGHT WRIST 7/21/2020 8:55 pm COMPARISON: None. HISTORY: ORDERING SYSTEM PROVIDED HISTORY: injury TECHNOLOGIST PROVIDED HISTORY: Reason for exam:->injury Reason for Exam: Chepe Sabrina. C/o pain and swelling to the right hand and wrist. Acuity: Acute Type of Exam: Initial FINDINGS: There is a comminuted fracture of the distal radial metaphysis with intra-articular extension. There is mild degenerative joint disease at the 1st carpometacarpal joint space. There is soft tissue swelling.      Comminuted intra-articular narrowing. SOFT TISSUES: There is no prevertebral soft tissue swelling. No acute abnormality of the cervical spine. PROCEDURES   Unless otherwise noted below, none     Procedures  Thumb spica splint was placed by the emergency department technician. It was applied appropriately and the patient was neurovascularly intact as observed by myself. Volar splint was placed by the emergency department technician. It was applied appropriately and the patient was neurovascularly intact as observed by myself. CRITICAL CARE TIME   N/A    CONSULTS:  None      EMERGENCY DEPARTMENT COURSE and DIFFERENTIAL DIAGNOSIS/MDM:   Vitals:    Vitals:    07/21/20 2013   BP: (!) 162/89   Pulse: 77   Resp: 16   Temp: 98.4 °F (36.9 °C)   TempSrc: Oral   SpO2: 100%   Weight: 160 lb (72.6 kg)   Height: 5' 6.5\" (1.689 m)       Patient was given the following medications:  Medications   HYDROcodone-acetaminophen (NORCO) 5-325 MG per tablet 1 tablet (1 tablet Oral Given 7/21/20 2305)           Patient presents for evaluation of multiple injury status post mechanical fall. On exam, she is resting comfortably in bed in no acute distress and nontoxic. She slightly hypertensive but vitals otherwise stable and she is afebrile. Slurred oriented x3. GCS 15. Cranial nerves II through XII are intact. She has significant swelling and ecchymotic discoloration to her right upper forehead and surrounding right eye. Pupils are equal round and reactive to light. Extraocular movements are intact without evidence of entrapment or any difficulty. Crepitus or bogginess. Remainder of scalp is atraumatic, neck and back are nontender. Lungs are clear to auscultation bilaterally, chest is nontender and abdomen is benign. Patient has been again swelling and ecchymotic discoloration rthere is no bony step-offs of the left thenar eminence extending into the radial aspect of the right wrist.  Range of motion of wrist is intact.   She is able to wiggle fingers neurovascular intact distally. She also has tenderness generally of the right wrist.  Range of motion decreased secondary to pain and swelling. No obvious deformity or dislocation. Given Norco for symptomatic relief and ice was applied. She will be reevaluated. CT the head shows no acute intracranial abnormality. CT the facial bones shows periorbital preseptal soft tissue swelling and right frontal scalp hematoma with no fracture. CT the cervical spine is unremarkable. X-ray of the left hand shows a comminuted intra-articular fracture of the base of the first metacarpal.  X-ray the right wrist shows comminuted particular fracture of the distal radial metaphysis. Associated soft tissue swelling. Placed in a left thumb spica splint, right volar splint and given arm sling. She is neurovascularly intact post application. Symptomatic and supportive care was discussed including rest, ice and elevation. I did remove her contact lenses in the ED and was she was informed this periorbital swelling may get worse before gets better. She was encouraged to keep the head of her bed lifted or sleep on multiple pillows. She can take Tylenol and ibuprofen was given Norco as needed for additional breakthrough pain. Given orthopedic contact information for reevaluation more definitive treatment. I estimate there is LOW risk for SKULL FRACTURE, INTRACRANIAL HEMORRHAGE, CERVICAL SPINE INJURY, SUBDURAL OR EPIDURAL HEMATOMA,  thus I consider the discharge disposition reasonable. I estimate there is LOW risk for COMPARTMENT SYNDROME, DEEP VENOUS THROMBOSIS, SEPTIC ARTHRITIS, TENDON OR NEUROVASCULAR INJURY, thus I consider the discharge disposition reasonable. Conditions for return to the ED were also discussed such as any new or worsening symptoms or signs of more acute head injury, neurovascular compromise or intractable pain.   She is agreeable to this plan and stable for discharge at this time.         FINAL IMPRESSION      1. Fall, initial encounter    2. Other closed intra-articular fracture of distal end of right radius, initial encounter    3. Injury of head, initial encounter    4. Contusion of face, initial encounter    5. Closed fracture of first metacarpal bone of left hand, unspecified fracture morphology, unspecified portion of metacarpal, initial encounter          DISPOSITION/PLAN   DISPOSITION        PATIENT REFERREDTO:  Tita Mcfadden MD  [address] [place] [address] [address]  [place] [id number] [phone]    Schedule an appointment as soon as possible for a visit       [facility] Emergency Department  [address] [place] [address]  [phone]  Go to   If symptoms worsen      DISCHARGE MEDICATIONS:  New Prescriptions    HYDROCODONE-ACETAMINOPHEN (NORCO) 5-325 MG PER TABLET    Take 1 tablet by mouth every 6 hours as needed for Pain for up to 3 days.        DISCONTINUED MEDICATIONS:  Discontinued Medications    No medications on file              (Please note that portions of this note were completed with a voice recognition program.  Efforts were made to edit the dictations but occasionally words are mis-transcribed.)    Dara Sinha PA-C (electronically signed)           Jose Juan Hogan, [place]  07/21/20 7078

## 2020-07-22 NOTE — ED NOTES
Nursing Discharge Notes:  -Patient discharged at this time in no acute distress after verbalizing understanding of discharge instructions.  -A copy of the AVS was reviewed with pt and family.  -Pt received applicable scripts which were reviewed with pt and family by this RN. -Pt was given the opportunity to ask questions before signing for discharge.  -Splints applied to bilat arms. Sling applied to right arm.     -Pt left ambulatory to lobby / discharge area. Patient Education:  Learner - Patient and family. Motivation and Readiness To Learn - Medium to High  Barriers To Learning - None  Learning Preference / Provided Instructions - Both written and verbal discharge instructions.        Jasmina Pompa, TAM  07/22/20 2181

## 2020-07-27 ENCOUNTER — OFFICE VISIT (OUTPATIENT)
Dept: ORTHOPEDIC SURGERY | Age: 70
End: 2020-07-27
Payer: MEDICARE

## 2020-07-27 ENCOUNTER — NURSE ONLY (OUTPATIENT)
Dept: PRIMARY CARE CLINIC | Age: 70
End: 2020-07-27
Payer: MEDICARE

## 2020-07-27 ENCOUNTER — ANESTHESIA EVENT (OUTPATIENT)
Dept: OPERATING ROOM | Age: 70
End: 2020-07-27
Payer: MEDICARE

## 2020-07-27 ENCOUNTER — OFFICE VISIT (OUTPATIENT)
Dept: FAMILY MEDICINE CLINIC | Age: 70
End: 2020-07-27
Payer: MEDICARE

## 2020-07-27 VITALS
HEART RATE: 81 BPM | SYSTOLIC BLOOD PRESSURE: 132 MMHG | BODY MASS INDEX: 25.11 KG/M2 | HEIGHT: 67 IN | WEIGHT: 160 LBS | TEMPERATURE: 99.4 F | DIASTOLIC BLOOD PRESSURE: 78 MMHG | OXYGEN SATURATION: 98 %

## 2020-07-27 VITALS — BODY MASS INDEX: 25.11 KG/M2 | WEIGHT: 160 LBS | HEIGHT: 67 IN | RESPIRATION RATE: 16 BRPM

## 2020-07-27 PROCEDURE — 29075 APPL CST ELBW FNGR SHORT ARM: CPT | Performed by: ORTHOPAEDIC SURGERY

## 2020-07-27 PROCEDURE — 99211 OFF/OP EST MAY X REQ PHY/QHP: CPT | Performed by: NURSE PRACTITIONER

## 2020-07-27 PROCEDURE — 99204 OFFICE O/P NEW MOD 45 MIN: CPT | Performed by: ORTHOPAEDIC SURGERY

## 2020-07-27 PROCEDURE — 99213 OFFICE O/P EST LOW 20 MIN: CPT | Performed by: FAMILY MEDICINE

## 2020-07-27 PROCEDURE — G8510 SCR DEP NEG, NO PLAN REQD: HCPCS | Performed by: FAMILY MEDICINE

## 2020-07-27 PROCEDURE — 93000 ELECTROCARDIOGRAM COMPLETE: CPT | Performed by: FAMILY MEDICINE

## 2020-07-27 RX ORDER — OXYCODONE HYDROCHLORIDE AND ACETAMINOPHEN 5; 325 MG/1; MG/1
1 TABLET ORAL EVERY 6 HOURS PRN
Qty: 28 TABLET | Refills: 0 | Status: SHIPPED | OUTPATIENT
Start: 2020-07-27 | End: 2020-08-03

## 2020-07-27 ASSESSMENT — PATIENT HEALTH QUESTIONNAIRE - PHQ9
SUM OF ALL RESPONSES TO PHQ QUESTIONS 1-9: 1
SUM OF ALL RESPONSES TO PHQ9 QUESTIONS 1 & 2: 1
1. LITTLE INTEREST OR PLEASURE IN DOING THINGS: 0
2. FEELING DOWN, DEPRESSED OR HOPELESS: 1
SUM OF ALL RESPONSES TO PHQ QUESTIONS 1-9: 1

## 2020-07-27 NOTE — PROGRESS NOTES
Sergioby Chris    Age 72 y.o.    female    1950    MRN [id number]    7/28/2020  Arrival Time_____________  OR Time____________[address] [place]     Procedure(s):  OPEN REDUCTION INTERNAL FIXATION LEFT THUMB CASTAÑEDA'S FRACTURE                      General    Surgeon(s):  Melisa Cyn, MD       Phone [phone] ([place])     [address]  Cell Work  _________________________________________________________________  _________________________________________________________________  _________________________________________________________________  _________________________________________________________________  _________________________________________________________________      PCP _____________________________ Phone_________________       H&P__________________Bringing    Chart            Epic  DOS     Called_______  EKG__________________Bringing    Chart            Epic  DOS     Called_______  LAB__________________ Bringing    Chart            Epic  DOS     Called_______  Cardiac Clearance_______Bringing    Chart            Epic      DOS       Called_______    Cardiologist________________________ Phone___________________________      ? Mosque concerns / Waiver on Chart            PAT Communications_____________  ? Pre-op Instructions Given [place]          ______________________________  ? Directions to Surgery Center                          ______________________________  ? Transportation Home_______________      _______________________________  ?  Crutches/Walker__________________        _______________________________      ________Pre-op Orders   _______Transcribed    _______Wt.  ________Pharmacy          _______SCD  ______VTE     ______Beta Blocker  ________Consent             ________TED Judy Coca

## 2020-07-27 NOTE — PROGRESS NOTES
Smokeless tobacco: Never Used   Substance Use Topics    Alcohol use: Yes     Alcohol/week: 0.0 standard drinks     Comment: 0-4 glasses of wine per month         REVIEW OF SYSTEMS:    Constitutional:  Feeling well, No fever, chills, no weight change, no fatigue  Eyes:  no vision loss/disturbance  Ears, nose, mouth, throat, and face:    No hearing change, no sore throat, no rhinorrhea, no nasal congestion  Respiratory:   no cough, no shortness of breath, no dyspnea on exertion,   Cardiovascular:   No chest pain, no palpitations, no irregular heart beat, no edema  Gastrointestinal:   No change in bowels, no pain, no nausea or vomiting, no blood or black tarry stool  Genitourinary:   No change in bladder, no nocturia  Hematologic/lymphatic:   No bleeding, no easy bruising  Musculoskeletal:    + bilateral hand and arm pain  Behavioral/Psych:    No depression, no anxiety  Skin:    No rashes, no new lesions    PHYSICAL EXAM  Vitals:    07/27/20 1608   BP: 132/78   Site: Right Upper Arm   Position: Sitting   Cuff Size: Medium Adult   Pulse: 81   Temp: 99.4 °F (37.4 °C)   TempSrc: Tympanic   SpO2: 98%   Weight: 160 lb (72.6 kg)   Height: 5' 6.5\" (1.689 m)     Body mass index is 25.44 kg/m². CONSTITUTIONAL: Alert and oriented x 4 NAD, affect appropriate and overweight, well hydrated, well developed. Eyes:  Lids and lashes normal, pupils equal, round and reactive to light, extra ocular muscles intact, sclera clear, conjunctiva normal    Head/ENT:  Normocephalic, swelling and bruising around right eye, cheek and jaw, external ears without lesions, Canals normal, TM clear bilaterally, oral pharynx with moist mucus membranes, tonsils without erythema or exudates, gums normal and good dentition.      Neck:  Supple, symmetrical, trachea midline, no adenopathy, thyroid symmetric, not enlarged and no tenderness, skin normal    Heart: Regular rate and rhythm, normal S1 and S2, and no murmur noted    Lungs:  No increased work of breathing, good air exchange, clear to auscultation bilaterally    Abdomen:  Normal bowel sounds, soft, non-distended, non-tender, no masses palpated, no hepatosplenomegally    Extremities:  No loss of hair, no edema, nl pedal pulses bilaterally, no skin lesions    NEUROLOGIC:Cranial nerves II-XII are grossly intact. Motor is 5 out of 5 bilaterally.       EKG Interpretation:  normal EKG, normal sinus rhythm,       Assessment:           ASSESSMENT AND PLAN:       Adam Adjutant was seen today for pre-op exam.    Diagnoses and all orders for this visit:    Unspecified fracture of first metacarpal bone, left hand, initial encounter for closed fracture    Preop examination  -     EKG 12 Lead    Medically stable for planned procedure

## 2020-07-27 NOTE — PROGRESS NOTES
Assessment: #1 Guzmán's fracture of the left thumb which is definitely angled further since her initial x-rays. #2 intra-articular right distal radius fracture but without significant step-off in the articular surface and while she is about 1 mm ulnar positive variance she has still has 0 degrees dorsal tilt    Treatment Plan: We placed her in a short arm cast for her right distal radius fracture today. I went ahead and discussed risk and benefits of open reduction internal fixation of her Guzmán's fracture including the potential need for subsequent hardware removal.  We performed a preoperative history and physical today and we will schedule her for surgery urgently for tomorrow    Return for post op, X-ray next visit. Chief Complaint:  Hand Pain (NP LT WRIST/RT HAND: WAS TRYING TO PUT HER HOSE AWAY AND TRIPPED AND FELL ON 7/21. NEW XR OUT OF SPLINT)      History of Present Illness  Kena Aranda is a 72 y.o. female. She is a new patient here for her left hand and right wrist after a fall a week ago while trying to put a hose away. Location: left hand, right wrist Severity: pain, swelling, stiffness Duration: 07/21/2020 Modifying factors: ED, splinted  Associated symptoms: noone    Contributory History  None    Medical History    Current Outpatient Medications on File Prior to Visit   Medication Sig Dispense Refill    pravastatin (PRAVACHOL) 40 MG tablet TAKE ONE TABLET BY MOUTH DAILY 90 tablet 3    citalopram (CELEXA) 10 MG tablet TAKE ONE TABLET BY MOUTH DAILY 90 tablet 3     No current facility-administered medications on file prior to visit.       Past Medical History:   Diagnosis Date    Anxiety     Hyperlipidemia      Allergies   Allergen Reactions    Pneumococcal Vaccines Other (See Comments)     Large local reaction    Prednisone Palpitations     dizziness     Social History     Socioeconomic History    Marital status: Single     Spouse name: Not on file    Number of children: Not on file    Years of education: Not on file    Highest education level: Not on file   Occupational History    Not on file   Social Needs    Financial resource strain: Not on file    Food insecurity     Worry: Not on file     Inability: Not on file    Transportation needs     Medical: Not on file     Non-medical: Not on file   Tobacco Use    Smoking status: Former Smoker     Packs/day: 1.00     Years: 10.00     Pack years: 10.00     Last attempt to quit: 1983     Years since quittin.6    Smokeless tobacco: Never Used   Substance and Sexual Activity    Alcohol use: Yes     Alcohol/week: 0.0 standard drinks     Comment: occasional    Drug use: No    Sexual activity: Not Currently   Lifestyle    Physical activity     Days per week: Not on file     Minutes per session: Not on file    Stress: Not on file   Relationships    Social connections     Talks on phone: Not on file     Gets together: Not on file     Attends Yazidism service: Not on file     Active member of club or organization: Not on file     Attends meetings of clubs or organizations: Not on file     Relationship status: Not on file    Intimate partner violence     Fear of current or ex partner: Not on file     Emotionally abused: Not on file     Physically abused: Not on file     Forced sexual activity: Not on file   Other Topics Concern    Not on file   Social History Narrative    Not on file     Family History   Problem Relation Age of Onset    High Cholesterol Mother     Other Father         colon polyps    Colon Cancer Father     Other Sister         gerd       Patient's medications, allergies, past medical, surgical, social and family histories were reviewed and updated as appropriate.     Review of Systems  Pertinent items are noted in HPI  Denies fever chills, confusion and bowel and bladder active change  Complete Review of Systems reviewed from patient history form dated 2020 and available in the patients chart accurate. Orders Placed This Encounter   Procedures    XR HAND LEFT (MIN 3 VIEWS)     Standing Status:   Future     Number of Occurrences:   1     Standing Expiration Date:   7/27/2021     Order Specific Question:   Reason for exam:     Answer:   THUMB- BETT'S VIEW    XR WRIST RIGHT (MIN 3 VIEWS)     Standing Status:   Future     Number of Occurrences:   1     Standing Expiration Date:   7/27/2021    COVID-19 Ambulatory     Rapid test     Standing Status:   Future     Standing Expiration Date:   7/27/2021     Scheduling Instructions:      Saline media preferred given current shortage of viral transport media but both acceptable     Order Specific Question:   Status     Answer:   Asymptomatic/Surveillance (e.g. pre-op/pre-procedure, pre-delivery, transfer)     Order Specific Question:   Reason for Test     Answer:   Imminent Surgery or Active Labor Patient    VA CAST SUP SHT ARM ADULT FBRGL    VA APPLY FOREARM CAST       Attestation: I have reviewed the chief complaint and history of present illness (including ROS and [address]) and vital documentation by my staff and I agree with their documentation and have added where applicable.

## 2020-07-28 ENCOUNTER — TELEPHONE (OUTPATIENT)
Dept: ORTHOPEDIC SURGERY | Age: 70
End: 2020-07-28

## 2020-07-28 ENCOUNTER — ANESTHESIA (OUTPATIENT)
Dept: OPERATING ROOM | Age: 70
End: 2020-07-28
Payer: MEDICARE

## 2020-07-28 ENCOUNTER — HOSPITAL ENCOUNTER (OUTPATIENT)
Age: 70
Setting detail: OUTPATIENT SURGERY
Discharge: HOME | End: 2020-07-28
Attending: ORTHOPAEDIC SURGERY | Admitting: ORTHOPAEDIC SURGERY
Payer: MEDICARE

## 2020-07-28 VITALS
HEIGHT: 67 IN | OXYGEN SATURATION: 100 % | WEIGHT: 160 LBS | DIASTOLIC BLOOD PRESSURE: 73 MMHG | TEMPERATURE: 97 F | SYSTOLIC BLOOD PRESSURE: 135 MMHG | BODY MASS INDEX: 25.11 KG/M2 | HEART RATE: 69 BPM | RESPIRATION RATE: 16 BRPM

## 2020-07-28 VITALS
SYSTOLIC BLOOD PRESSURE: 110 MMHG | RESPIRATION RATE: 7 BRPM | OXYGEN SATURATION: 100 % | DIASTOLIC BLOOD PRESSURE: 74 MMHG

## 2020-07-28 PROBLEM — S62.212A: Status: ACTIVE | Noted: 2020-07-28

## 2020-07-28 LAB — SARS-COV-2, NAAT: NOT DETECTED

## 2020-07-28 PROCEDURE — 6360000002 HC RX W HCPCS: Performed by: NURSE ANESTHETIST, CERTIFIED REGISTERED

## 2020-07-28 PROCEDURE — U0002 COVID-19 LAB TEST NON-CDC: HCPCS

## 2020-07-28 PROCEDURE — 7100000011 HC PHASE II RECOVERY - ADDTL 15 MIN: Performed by: ORTHOPAEDIC SURGERY

## 2020-07-28 PROCEDURE — 2720000010 HC SURG SUPPLY STERILE: Performed by: ORTHOPAEDIC SURGERY

## 2020-07-28 PROCEDURE — 2500000003 HC RX 250 WO HCPCS: Performed by: NURSE ANESTHETIST, CERTIFIED REGISTERED

## 2020-07-28 PROCEDURE — C1713 ANCHOR/SCREW BN/BN,TIS/BN: HCPCS | Performed by: ORTHOPAEDIC SURGERY

## 2020-07-28 PROCEDURE — 6360000002 HC RX W HCPCS: Performed by: ORTHOPAEDIC SURGERY

## 2020-07-28 PROCEDURE — 7100000010 HC PHASE II RECOVERY - FIRST 15 MIN: Performed by: ORTHOPAEDIC SURGERY

## 2020-07-28 PROCEDURE — 2709999900 HC NON-CHARGEABLE SUPPLY: Performed by: ORTHOPAEDIC SURGERY

## 2020-07-28 PROCEDURE — 3700000001 HC ADD 15 MINUTES (ANESTHESIA): Performed by: ORTHOPAEDIC SURGERY

## 2020-07-28 PROCEDURE — 7100000001 HC PACU RECOVERY - ADDTL 15 MIN: Performed by: ORTHOPAEDIC SURGERY

## 2020-07-28 PROCEDURE — 2500000003 HC RX 250 WO HCPCS: Performed by: ORTHOPAEDIC SURGERY

## 2020-07-28 PROCEDURE — 3600000003 HC SURGERY LEVEL 3 BASE: Performed by: ORTHOPAEDIC SURGERY

## 2020-07-28 PROCEDURE — 7100000000 HC PACU RECOVERY - FIRST 15 MIN: Performed by: ORTHOPAEDIC SURGERY

## 2020-07-28 PROCEDURE — 3700000000 HC ANESTHESIA ATTENDED CARE: Performed by: ORTHOPAEDIC SURGERY

## 2020-07-28 PROCEDURE — 3600000013 HC SURGERY LEVEL 3 ADDTL 15MIN: Performed by: ORTHOPAEDIC SURGERY

## 2020-07-28 PROCEDURE — 2500000003 HC RX 250 WO HCPCS: Performed by: ANESTHESIOLOGY

## 2020-07-28 PROCEDURE — 2580000003 HC RX 258: Performed by: ANESTHESIOLOGY

## 2020-07-28 DEVICE — 2.0 CORTICAL SCREW 09MM, HD6, 5/PKG
Type: IMPLANTABLE DEVICE | Site: THUMB | Status: FUNCTIONAL
Brand: APTUS

## 2020-07-28 DEVICE — 2.0 CORTICAL SCREW 10MM, HD6, 5/PKG
Type: IMPLANTABLE DEVICE | Site: THUMB | Status: FUNCTIONAL
Brand: APTUS

## 2020-07-28 DEVICE — 2.0/2.3 TRILOCK PLATE 3/4 HOLE T, T1.0
Type: IMPLANTABLE DEVICE | Site: THUMB | Status: FUNCTIONAL
Brand: APTUS

## 2020-07-28 DEVICE — 2.0 CORTICAL SCREW 14MM, HD6, 5/PKG
Type: IMPLANTABLE DEVICE | Site: THUMB | Status: FUNCTIONAL
Brand: APTUS

## 2020-07-28 DEVICE — 2.0 CORTICAL SCREW 12MM, HD6, 5/PKG
Type: IMPLANTABLE DEVICE | Site: THUMB | Status: FUNCTIONAL
Brand: APTUS

## 2020-07-28 RX ORDER — HYDRALAZINE HYDROCHLORIDE 20 MG/ML
5 INJECTION INTRAMUSCULAR; INTRAVENOUS EVERY 10 MIN PRN
Status: DISCONTINUED | OUTPATIENT
Start: 2020-07-28 | End: 2020-07-28 | Stop reason: HOSPADM

## 2020-07-28 RX ORDER — HYDROCODONE BITARTRATE AND ACETAMINOPHEN 5; 325 MG/1; MG/1
1 TABLET ORAL EVERY 6 HOURS PRN
COMMUNITY
End: 2020-12-17

## 2020-07-28 RX ORDER — BUPIVACAINE HYDROCHLORIDE 5 MG/ML
INJECTION, SOLUTION EPIDURAL; INTRACAUDAL PRN
Status: DISCONTINUED | OUTPATIENT
Start: 2020-07-28 | End: 2020-07-28 | Stop reason: ALTCHOICE

## 2020-07-28 RX ORDER — PROPOFOL 10 MG/ML
INJECTION, EMULSION INTRAVENOUS PRN
Status: DISCONTINUED | OUTPATIENT
Start: 2020-07-28 | End: 2020-07-28 | Stop reason: SDUPTHER

## 2020-07-28 RX ORDER — LABETALOL HYDROCHLORIDE 5 MG/ML
5 INJECTION, SOLUTION INTRAVENOUS EVERY 10 MIN PRN
Status: DISCONTINUED | OUTPATIENT
Start: 2020-07-28 | End: 2020-07-28 | Stop reason: HOSPADM

## 2020-07-28 RX ORDER — ONDANSETRON 2 MG/ML
INJECTION INTRAMUSCULAR; INTRAVENOUS PRN
Status: DISCONTINUED | OUTPATIENT
Start: 2020-07-28 | End: 2020-07-28 | Stop reason: SDUPTHER

## 2020-07-28 RX ORDER — OXYCODONE HYDROCHLORIDE AND ACETAMINOPHEN 5; 325 MG/1; MG/1
1 TABLET ORAL PRN
Status: DISCONTINUED | OUTPATIENT
Start: 2020-07-28 | End: 2020-07-28 | Stop reason: HOSPADM

## 2020-07-28 RX ORDER — MEPERIDINE HYDROCHLORIDE 50 MG/ML
12.5 INJECTION INTRAMUSCULAR; INTRAVENOUS; SUBCUTANEOUS EVERY 5 MIN PRN
Status: DISCONTINUED | OUTPATIENT
Start: 2020-07-28 | End: 2020-07-28 | Stop reason: HOSPADM

## 2020-07-28 RX ORDER — SODIUM CHLORIDE 0.9 % (FLUSH) 0.9 %
10 SYRINGE (ML) INJECTION PRN
Status: DISCONTINUED | OUTPATIENT
Start: 2020-07-28 | End: 2020-07-28 | Stop reason: HOSPADM

## 2020-07-28 RX ORDER — OXYCODONE HYDROCHLORIDE AND ACETAMINOPHEN 5; 325 MG/1; MG/1
2 TABLET ORAL PRN
Status: DISCONTINUED | OUTPATIENT
Start: 2020-07-28 | End: 2020-07-28 | Stop reason: HOSPADM

## 2020-07-28 RX ORDER — LIDOCAINE HYDROCHLORIDE 20 MG/ML
INJECTION, SOLUTION EPIDURAL; INFILTRATION; INTRACAUDAL; PERINEURAL PRN
Status: DISCONTINUED | OUTPATIENT
Start: 2020-07-28 | End: 2020-07-28 | Stop reason: SDUPTHER

## 2020-07-28 RX ORDER — MORPHINE SULFATE 2 MG/ML
2 INJECTION, SOLUTION INTRAMUSCULAR; INTRAVENOUS EVERY 5 MIN PRN
Status: DISCONTINUED | OUTPATIENT
Start: 2020-07-28 | End: 2020-07-28 | Stop reason: HOSPADM

## 2020-07-28 RX ORDER — ACETAMINOPHEN 500 MG
500 TABLET ORAL EVERY 6 HOURS PRN
COMMUNITY
End: 2020-12-17

## 2020-07-28 RX ORDER — SODIUM CHLORIDE 0.9 % (FLUSH) 0.9 %
10 SYRINGE (ML) INJECTION EVERY 12 HOURS SCHEDULED
Status: DISCONTINUED | OUTPATIENT
Start: 2020-07-28 | End: 2020-07-28 | Stop reason: HOSPADM

## 2020-07-28 RX ORDER — FENTANYL CITRATE 50 UG/ML
INJECTION, SOLUTION INTRAMUSCULAR; INTRAVENOUS PRN
Status: DISCONTINUED | OUTPATIENT
Start: 2020-07-28 | End: 2020-07-28 | Stop reason: SDUPTHER

## 2020-07-28 RX ORDER — SODIUM CHLORIDE, SODIUM LACTATE, POTASSIUM CHLORIDE, CALCIUM CHLORIDE 600; 310; 30; 20 MG/100ML; MG/100ML; MG/100ML; MG/100ML
INJECTION, SOLUTION INTRAVENOUS CONTINUOUS
Status: DISCONTINUED | OUTPATIENT
Start: 2020-07-28 | End: 2020-07-28 | Stop reason: HOSPADM

## 2020-07-28 RX ORDER — DIPHENHYDRAMINE HYDROCHLORIDE 50 MG/ML
12.5 INJECTION INTRAMUSCULAR; INTRAVENOUS
Status: DISCONTINUED | OUTPATIENT
Start: 2020-07-28 | End: 2020-07-28 | Stop reason: HOSPADM

## 2020-07-28 RX ORDER — PROMETHAZINE HYDROCHLORIDE 25 MG/ML
6.25 INJECTION, SOLUTION INTRAMUSCULAR; INTRAVENOUS
Status: DISCONTINUED | OUTPATIENT
Start: 2020-07-28 | End: 2020-07-28 | Stop reason: HOSPADM

## 2020-07-28 RX ORDER — ONDANSETRON 2 MG/ML
4 INJECTION INTRAMUSCULAR; INTRAVENOUS PRN
Status: DISCONTINUED | OUTPATIENT
Start: 2020-07-28 | End: 2020-07-28 | Stop reason: HOSPADM

## 2020-07-28 RX ORDER — MORPHINE SULFATE 2 MG/ML
1 INJECTION, SOLUTION INTRAMUSCULAR; INTRAVENOUS EVERY 5 MIN PRN
Status: DISCONTINUED | OUTPATIENT
Start: 2020-07-28 | End: 2020-07-28 | Stop reason: HOSPADM

## 2020-07-28 RX ADMIN — FAMOTIDINE 20 MG: 10 INJECTION, SOLUTION INTRAVENOUS at 09:24

## 2020-07-28 RX ADMIN — PROPOFOL 200 MG: 10 INJECTION, EMULSION INTRAVENOUS at 10:45

## 2020-07-28 RX ADMIN — CEFAZOLIN SODIUM 2 G: 10 INJECTION, POWDER, FOR SOLUTION INTRAVENOUS at 10:42

## 2020-07-28 RX ADMIN — PROPOFOL 100 MG: 10 INJECTION, EMULSION INTRAVENOUS at 10:47

## 2020-07-28 RX ADMIN — SODIUM CHLORIDE, POTASSIUM CHLORIDE, SODIUM LACTATE AND CALCIUM CHLORIDE: 600; 310; 30; 20 INJECTION, SOLUTION INTRAVENOUS at 09:24

## 2020-07-28 RX ADMIN — FENTANYL CITRATE 50 MCG: 50 INJECTION INTRAMUSCULAR; INTRAVENOUS at 10:45

## 2020-07-28 RX ADMIN — LIDOCAINE HYDROCHLORIDE 50 MG: 20 INJECTION, SOLUTION EPIDURAL; INFILTRATION; INTRACAUDAL; PERINEURAL at 10:45

## 2020-07-28 RX ADMIN — ONDANSETRON 4 MG: 2 INJECTION INTRAMUSCULAR; INTRAVENOUS at 11:04

## 2020-07-28 RX ADMIN — FENTANYL CITRATE 50 MCG: 50 INJECTION INTRAMUSCULAR; INTRAVENOUS at 11:54

## 2020-07-28 ASSESSMENT — PULMONARY FUNCTION TESTS
PIF_VALUE: 2
PIF_VALUE: 29
PIF_VALUE: 2
PIF_VALUE: 20
PIF_VALUE: 2
PIF_VALUE: 20
PIF_VALUE: 8
PIF_VALUE: 2
PIF_VALUE: 14
PIF_VALUE: 25
PIF_VALUE: 0
PIF_VALUE: 2
PIF_VALUE: 19
PIF_VALUE: 2
PIF_VALUE: 2
PIF_VALUE: 27
PIF_VALUE: 2
PIF_VALUE: 14
PIF_VALUE: 2
PIF_VALUE: 23
PIF_VALUE: 1
PIF_VALUE: 2
PIF_VALUE: 11
PIF_VALUE: 2
PIF_VALUE: 19
PIF_VALUE: 2
PIF_VALUE: 19
PIF_VALUE: 2
PIF_VALUE: 2
PIF_VALUE: 0
PIF_VALUE: 11
PIF_VALUE: 2
PIF_VALUE: 2
PIF_VALUE: 14
PIF_VALUE: 0
PIF_VALUE: 18
PIF_VALUE: 2
PIF_VALUE: 11
PIF_VALUE: 23
PIF_VALUE: 2
PIF_VALUE: 19
PIF_VALUE: 2
PIF_VALUE: 2
PIF_VALUE: 24
PIF_VALUE: 2
PIF_VALUE: 0
PIF_VALUE: 2
PIF_VALUE: 2
PIF_VALUE: 3
PIF_VALUE: 2
PIF_VALUE: 2

## 2020-07-28 ASSESSMENT — PAIN SCALES - GENERAL
PAINLEVEL_OUTOF10: 0

## 2020-07-28 ASSESSMENT — PAIN - FUNCTIONAL ASSESSMENT: PAIN_FUNCTIONAL_ASSESSMENT: 0-10

## 2020-07-28 NOTE — ANESTHESIA PRE PROCEDURE
Department of Anesthesiology  Preprocedure Note       Name:  Travis Emmanuel   Age:  72 y.o.  :  1950                                          MRN:  [id number]         Date:  2020      Surgeon: Erin Chris):  Linnette Tadeo MD    Procedure: Procedure(s):  OPEN REDUCTION INTERNAL FIXATION LEFT THUMB CASTAÑEDA'S FRACTURE    Medications prior to admission:   Prior to Admission medications    Medication Sig Start Date End Date Taking? Authorizing Provider   acetaminophen (TYLENOL) 500 MG tablet Take 500 mg by mouth every 6 hours as needed for Pain 2 tablets   Yes Historical Provider, MD   HYDROcodone-acetaminophen (NORCO) 5-325 MG per tablet Take 1 tablet by mouth every 6 hours as needed for Pain. Yes Historical Provider, MD   pravastatin (PRAVACHOL) 40 MG tablet TAKE ONE TABLET BY MOUTH DAILY 19  Yes Talib Acevedo., MD   citalopram (CELEXA) 10 MG tablet TAKE ONE TABLET BY MOUTH DAILY 19 Yes Talib Acevedo., MD   oxyCODONE-acetaminophen (PERCOCET) 5-325 MG per tablet Take 1 tablet by mouth every 6 hours as needed for Pain for up to 7 days. Intended supply: 7 days. Take lowest dose possible to manage pain 7/27/20 8/3/20  Linnette Tadeo MD       Current medications:    Current Facility-Administered Medications   Medication Dose Route Frequency Provider Last Rate Last Dose    lactated ringers infusion   Intravenous Continuous Ang Palafox MD        sodium chloride flush 0.9 % injection 10 mL  10 mL Intravenous 2 times per day Ang Palafox MD        sodium chloride flush 0.9 % injection 10 mL  10 mL Intravenous PRN Ang Palafox MD        famotidine (PEPCID) injection 20 mg  20 mg Intravenous Once Ang Palafox MD        ceFAZolin (ANCEF) 2 g in dextrose 5 % 100 mL IVPB  2 g Intravenous On Call to [facility] [place]           Allergies:     Allergies   Allergen Reactions    Pneumococcal Vaccines Other (See Comments)

## 2020-07-28 NOTE — ANESTHESIA POSTPROCEDURE EVALUATION
Department of Anesthesiology  Postprocedure Note    Patient: Travis Emmanuel  MRN: [id number]  YOB: 1950  Date of evaluation: 7/28/2020  Time:  2:44 PM     Procedure Summary     Date:  07/28/20 Room / Location:  [address] [place] [address]    Anesthesia Start:  1039 Anesthesia Stop:  9690    Procedure:  OPEN REDUCTION INTERNAL FIXATION LEFT THUMB GUZMÁN'S FRACTURE (Left Thumb) Diagnosis:       Closed Guzmán's fracture of left thumb, initial encounter      (Closed Guzmán's fracture of left thumb, initial encounter [L18.666C])    Surgeon:  Linnette Tadeo MD Responsible Provider:  Simi Colon MD    Anesthesia Type:  general ASA Status:  2          Anesthesia Type: general    Teresita Phase I: Teresita Score: 10    Teresita Phase II: Teresita Score: 10    Last vitals: Reviewed and per EMR flowsheets.        Anesthesia Post Evaluation    Comments: Postoperative Anesthesia Note    Name:    Travis Emmanuel  MRN:      [id number]    Patient Vitals in the past 12 hrs:  07/28/20 1230, BP:135/73, Pulse:69, Resp:16, SpO2:100 %  07/28/20 1220, BP:127/75, Pulse:68, Resp:15, SpO2:100 %  07/28/20 1215, BP:125/70, Temp:97 °F (36.1 °C), Pulse:70, Resp:14, SpO2:100 %  07/28/20 1211, SpO2:97 %  07/28/20 1210, BP:126/71, Pulse:68, Resp:15, SpO2:95 %  07/28/20 1208, BP:103/64, Pulse:67, Resp:14, SpO2:90 %  07/28/20 1204, BP:103/65, Temp:96.9 °F (36.1 °C), Temp src:Tympanic, Pulse:65, Resp:14, SpO2:(!) 85 %  07/28/20 0909, BP:118/74, Temp:96.4 °F (35.8 °C), Temp src:Temporal, Pulse:67, Resp:14, SpO2:96 %, Height:5' 6.5\" (1.689 m), Weight:160 lb (72.6 kg)     LABS:    CBC  No results found for: WBC, HGB, HCT, PLT  RENAL  Lab Results       Component                Value               Date/Time                  NA                       142                 12/10/2019 08:06 AM        K                        4.7                 12/10/2019 08:06 AM        CL                       104                 12/10/2019 08:06 AM CO2                      23                  12/10/2019 08:06 AM        BUN                      18                  12/10/2019 08:06 AM        CREATININE               0.6                 12/10/2019 08:06 AM        GLUCOSE                  87                  12/10/2019 08:06 AM   COAGS  No results found for: PROTIME, INR, APTT    Intake & Output:  @24HRIO@    Nausea & Vomiting:  No    Level of Consciousness:  Awake    Pain Assessment:  Adequate analgesia    Anesthesia Complications:  No apparent anesthetic complications    SUMMARY      Vital signs stable  OK to discharge from Stage I post anesthesia care.   Care transferred from Anesthesiology department on discharge from perioperative area

## 2020-07-28 NOTE — TELEPHONE ENCOUNTER
[address] for outpatient surgery 07/28/20 130[address] [facility], [address] [place] [address]

## 2020-07-29 NOTE — OP NOTE
[address] [address]                                OPERATIVE REPORT    PATIENT NAME: Katya Delgado                     :        1950  MED REC NO:   [id number]                          ROOM:  ACCOUNT NO:   [de-identified]                           ADMIT DATE: 2020  PROVIDER:     Dennis Melo MD    DATE OF PROCEDURE:  2020    PREOPERATIVE DIAGNOSIS:  Displaced left thumb Guzmán's fracture. POSTOPERATIVE DIAGNOSIS:  Displaced left thumb Guzmán's fracture. OPERATION PERFORMED:  1. Open reduction and internal fixation, left thumb Guzmán's fracture  using [employer] plate. 2.  Interpretation, intraoperative PA, lateral, and oblique x-rays of  the hand. SURGEON:  Dennis Melo MD    ANESTHESIA:  General.    COMPLICATIONS:  None. EBL  minimal  INDICATIONS FOR PROCEDURE:  The patient had a Guzmán's fracture, which  was angulating. Risks and benefits of open reduction and internal  fixation were discussed with the patient. DESCRIPTION OF PROCEDURE:  The patient was placed on the operating room  table in supine position and general anesthetic was placed. The arm was  prepped with ChloraPrep and draped in a sterile fashion. The arm was  exsanguinated using an Ace bandage and a well-padded tourniquet inflated  about the upper arm to 250 mmHg. A longitudinal incision was made over the dorsum of the thumb metacarpal  and the trapezium. Dissection was carried down carefully retracting the  extensor tendon mechanism and avoiding injury to the branches of the  radial sensory nerve. Periosteum was then elevated from the fracture  site. The [employer] three-hole T-shaped plate was then placed onto the  articular fragment and the plate itself was used to reduce the  angulatory deformity and then it was attached to the shaft fragment.   We  did have to move the plate more distally to prevent screw penetration  into the carpometacarpal joint. Extreme care was taken to visualize  radiographically that all hardware was extraarticular. We then took the  thumb through its full range of motion to be sure we had good solid  fixation. The tourniquet was then deflated. The periosteum was closed over the  plate using a running 4-0 Vicryl suture. The skin was closed with 4-0  nylon horizontal mattress sutures. A bulky dry sterile dressing and  thumb spica splint were applied. ADDENDUM TO THE OPERATIVE NOTE:  Interpretation, intraoperative PA,  lateral, and oblique x-rays of the hand, internal fixation of the  Guzmán's fracture had been performed. We had achieved anatomic  correction of the angulatory deformity and the dorsal plate was in good  position and all hardware was extraarticular and appropriate length.         Joyce Mcguire MD    D: 07/28/2020 15:27:25       T: 07/28/2020 17:30:49     [email or web address]  Job#: [id number]     Doc#: [id number]    CC:

## 2020-08-05 ENCOUNTER — OFFICE VISIT (OUTPATIENT)
Dept: ORTHOPEDIC SURGERY | Age: 70
End: 2020-08-05

## 2020-08-05 ENCOUNTER — HOSPITAL ENCOUNTER (OUTPATIENT)
Dept: OCCUPATIONAL THERAPY | Age: 70
Setting detail: THERAPIES SERIES
Discharge: HOME | End: 2020-08-05
Payer: MEDICARE

## 2020-08-05 LAB
REPORT: NORMAL
SARS-COV-2: NOT DETECTED
THIS TEST SENT TO: NORMAL

## 2020-08-05 PROCEDURE — 97110 THERAPEUTIC EXERCISES: CPT | Performed by: OCCUPATIONAL THERAPIST

## 2020-08-05 PROCEDURE — L3808 WHFO, RIGID W/O JOINTS: HCPCS | Performed by: OCCUPATIONAL THERAPIST

## 2020-08-05 PROCEDURE — 97165 OT EVAL LOW COMPLEX 30 MIN: CPT | Performed by: OCCUPATIONAL THERAPIST

## 2020-08-05 PROCEDURE — 99024 POSTOP FOLLOW-UP VISIT: CPT | Performed by: ORTHOPAEDIC SURGERY

## 2020-08-05 RX ORDER — CEPHALEXIN 500 MG/1
500 CAPSULE ORAL 4 TIMES DAILY
Qty: 28 CAPSULE | Refills: 0 | Status: SHIPPED | OUTPATIENT
Start: 2020-08-05 | End: 2020-12-17

## 2020-08-05 NOTE — PLAN OF CARE
[facility], [address]  Phone: [phone]  Fax [phone]    Occupational Therapy/Hand Therapy Certification  Dear Referring Practitioner: Dr. Chitra Lee    We had the pleasure of evaluating the following patient for occupational therapy services at [address] [place] [address]. A summary of our findings can be found in the initial assessment below. This includes our plan of care. If you have any questions or concerns regarding these findings, please do not hesitate to contact me at the office phone number checked above. Thank you for the referral.     Physician Signature:_______________________________Date:__________________  By signing above (or electronic signature), therapists plan is approved by physician      Patient: Nasima Vidal   : 1950   MRN: [id number]  Referring Physician: Referring Practitioner: Dr. Chitra Lee      Evaluation Date: 2020      Medical Diagnosis Information:  Diagnosis: L thumb bennets fracture s/p ORIF  M25.642 stiffness of L hand                                             Insurance information: OT Insurance Information: [employer] $40 copay 100% Med Arabella Gayle    Date of Injury: 20  Date of Surgery: 20    Date of Patient follow up with Physician:     RESTRICTIONS/PRECAUTIONS: FA based thumb spica . . no pinch     Latex Allergy:  [x]No      []Yes  Pacemaker:  [x] No       [] Yes     Preferred Language for Healthcare:   [x]English       []other:     Functional Scale: 82% (Quick DASH)   Date assessed:  2020    SUBJECTIVE: Patient reported deficits/history of current problem: fell and sustained R wrist fracture ( in cast) and L thumb bennets fracture     Pain Scale: 2/10  [x]Constant      []Intermittent    []other:  Pain Location:  L thumb   Easing factors: tylenol   Provocative factors: use      [x] Patient reported history, allergies, and medications reviewed - see intake form. Occupational Profile:  Home Enviroment: lives with  [] spouse,  [] family,  [x] alone,  [] significant other,   [] other:    Occupation/School: work as a  full time ( uses computer a lot)    Recreational Activities/Meaningful Interests: walking, work out     Prior Level of Function: [x] Independent with ADLs/IADLs     [] Assistance needed (describe):    Patient-Identified Primary Performance Deficits (to be addressed in POC):   [] bathing    [x] household tasks (cooking/cleaning)   [x] dressing    [] self feeding   [x] grooming    [x] work/education . ..off work right now until two more weeks from now    [] functional mobility   [] sleeping/rest   [] toileting/hygiene   [x] recreational activities   [x] driving    [] community/social participation   [x] other: opening things     Comorbidities Affecting Functional Performance:     [x]Anxiety (F41.9)/Depression (F32.9)   []Diabetes Type 1(E10.65) or 2 (E11.65)   []Rheumatoid Arthritis (M05.9)  []Fibromyalgia (M79.7)  []Neuropathy(G60.9)  []Osteoarthritis(M19.91)  []None   []Other:    OBJECTIVE:   Date:  Hand Dominance:     [x]  Right    [] Left 8/5/2020     Objective Measures:    PAIN 2/10   Quick DASH 82%                   Digits tip to DPFC in cm WNLS   Thumb ROM MP 15  IP 14   Thumb opposition  R:  L: 2.5 cm    Thumb Radial/Palmar abd ROM R:  L: 45   Wrist ROM Ext/Flex R:  L: 61/74   Rad/Uln dev ROM R:  L: 15/35   Forearm ROM  Sup/pron R:  L:   Elbow ROM Ext/flex R:  L:   Shoulder Flex  Shoulder Abd  Shoulder IR/ER    Edema in cm circumf. MCPJs R:  L: 18 cm    Edema in cm circumf.   Wrist R:  L:    strength in lbs R:  L: deferred    Pinch Strengthin lbs: lat  R:  L:   Pinch Strength in lbs:  3 point R:  L:     MMT:     Observations:  (including splints, bandages, incisions, scars): R wrist in a cast      Sensation:  [x] No reported deficits  [] Intact to light touch    [] Meraux Porter test completed, findings as noted:  [] Other:        Functional Mobility/Transfers/Gait:  [x] Independent - no significant gait deviations  [] Assistance needed   [] Assistive device used: Falls Risk Assessment (30 days):   [x] Falls Risk assessed and no intervention required. [] Falls Risk assessed and Patient requires intervention due to being higher risk   TUG score (>12s at risk):     [] Falls education provided, including      Review Of Systems (ROS): [x]Performed Review of systems (Integumentary, CardioPulmonary, Neurological) by intake and observation. Intake form has been scanned into medical record. Patient has been instructed to contact their primary care physician regarding ROS issues if not already being addressed at this time. ASSESSMENT:   This patient presents with signs and symptoms consistent with the medical diagnosis provided by the referring physician. Impairments (physical, cognitive and/or psychosocial):  [x] Decreased mobility   [x] Weakness    [x] Hypersensitivity   [x] Pain/tenderness   [] Edema/swelling   [] Decreased coordination (fine/gross motor)   [] Impaired body mechanics  [] Sensory loss  [] Loss of balance   [] Other:      Performance Deficits (to be addressed in plan of care):   [x] Bathing    [x] Household Tasks (cooking/cleaning)   [x] Dressing    [] Self Feeding   [x] Grooming    [] Work/Education   [] Functional Mobility   [] Sleeping/Rest   [] Toileting/Hygiene   [] Recreational Activities   [] Driving    [] Community/Social Participation   [] Other:     Rehab Potential:   [x] Excellent [] Good [] Fair  [] Poor     Barriers affecting rehab potential:  []Age    []Lack of Motivation   []Co-Morbidities  []Cognitive Function  []Environmental/home/work barriers  []Other:     Tolerance of evaluation/treatment:    [x] Excellent [] Good [] Fair  [] Poor    PLAN OF CARE:  Interventions:   [x] Therapeutic Exercise [x] Therapeutic Activity    [x] Activities of Daily Living [x] Neuromuscular Re-education      [x] Patient Education  [x] Manual Therapy      [x] Modalities as needed, and not otherwise contraindicated, including: ultrasound,paraffin,moist heat/cold pack, electrical stimulation, contrast bath, iontophoresis  [] Splinting    Frequency/Duration:  2 days per week for 8 weeks      GOALS:  Patient stated goal:    [x] Progressing: [] Met: [] Not Met: [] Adjusted    Therapist goals for Patient:   Short Term Goals: To be achieved in: 2 weeks  1. Independent in HEP and progression per patient tolerance, in order to prevent re-injury. [] Progressing: [] Met: [] Not Met: [] Adjusted   2. Patient will have a decrease in pain to facilitate improvement in movement, function, and ADLs as indicated by Functional Deficits. [] Progressing: [] Met: [] Not Met: [] Adjusted    Long Term Goals to be achieved in 8 weeks (through 10/5/20), including patient directed goals to address patient identified performance deficits:  1) Pt to be independent in graded HEP progression with a good level of effort and compliance. [] Progressing: [] Met: [] Not Met: [] Adjusted   2) Pt to report a score of </= 25% on the Quick DASH disability questionnaire for increased performance with carrying, moving, and handling objects. [] Progressing: [] Met: [] Not Met: [] Adjusted   3) Pt will demonstrate increased ROM to L thumb to touch base of small finger for improved independence with dressing and grooming . [] Progressing: [] Met: [] Not Met: [] Adjusted   4) Pt will demonstrate increased strength to L pinch to 50% of R for improved independence with buttoning . [] Progressing: [] Met: [] Not Met: [] Adjusted   5) Pt will have a decrease in pain to 0-1/10 to facilitate work tasks .   [] Progressing: [] Met: [] Not Met: [] Adjusted        OCCUPATIONAL THERAPY EVALUATION COMPLEXITY JUSTIFICATION:    [x] An occupational profile and medical/therapy history, which includes:   [x] a brief history including medical and/or therapy records relating to the     presenting problem   [] an expanded review of medical and/or therapy records and additional review     of physical, cognitive or psychosocial history related to current functional    performance   [] an extensive additional review of review of medical and/or therapy records   and physical, cognitive, or psychosocial history related to current    functional performance    [x] An assessment that identifies performance deficits (relating to physical, cognitive, or psychosocial skills) that result in activity limitations and/or participation restrictions:   [] 1-3 performance deficits   [x] 3-5 performance deficits   [] 5 or more performance deficits    [x] Clinical decision making of:   [x] low complexity, including analysis of occupational profile, data analysis from problem focused assessment, and consideration of a limited number of treatment options. No comorbidities affect occupational performance. No task modifications or assistance needed to complete evaluation. [] moderate complexity, including analysis of occupational profile, data analysis from detailed assessment and consideration of several treatment options. Comorbidities that affect occupational performance may be present. Minimal to moderate task modifications or assistance needed to complete assessment. [] high complexity, including analysis of occupational profile, analysis of data from comprehensive assessment and consideration of multiple treatment options. Multiple comorbidities present that affect occupational performance. Significant task modifications or assistance needed to complete assessment.     Evaluation Code:  [x] Low Complexity EVAL 04271 (typically 30 minutes face to face)  [] Mod Complexity EVAL 96173 (typically 45 minutes face to face)  [] High Complexity EVAL 91580 (typically 60 minutes face to face)    Electronically signed by:  Shaheen Shah  OTR/L [id number]

## 2020-08-05 NOTE — FLOWSHEET NOTE
verbal/tactile cueing for activities related to improving balance, coordination, kinesthetic sense, posture, motor skill, proprioception and motor activation to allow for proper function of scapular, scapulothoracic and UE control with self care, carrying, lifting, driving/computer work    Home Exercise Program:    [x] (94349) Reviewed/Progressed HEP activities related to strengthening, flexibility, endurance, ROM of scapular, scapulothoracic and UE control with self care, reaching, carrying, lifting, house/yardwork, driving/computer work  [] (75438) Reviewed/Progressed HEP activities related to improving balance, coordination, kinesthetic sense, posture, motor skill, proprioception of scapular, scapulothoracic and UE control with self care, reaching, carrying, lifting, house/yardwork, driving/computer work      Manual Treatments:  PROM / STM / Oscillations-Mobs:  G-I, II, III, IV (PA's, Inf., Post.)  [] (63580) Provided manual therapy to mobilize soft tissue/joints of cervical/CT, scapular GHJ and UE for the purpose of modulating pain, promoting relaxation,  increasing ROM, reducing/eliminating soft tissue swelling/inflammation/restriction, improving soft tissue extensibility and allowing for proper ROM for normal function with self care, reaching, carrying, lifting, house/yardwork, driving/computer work    ADL Training:  [] (89392) Provided self-care/home management training related to activities of daily living and compensatory training, and/or use of adaptive equipment      Charges:  Timed Code Treatment Minutes: 35   Total Treatment Minutes: 50    Worker's Comp: Time In/Time Out     [x] EVAL (LOW) 76871 (typically 20 minutes face-to-face)    [] EVAL (MOD) 83820 (typically 30 minutes face-to-face)  [] EVAL (HIGH) 21555 (typically 45 minutes face-to-face)  [] OT Re-eval (70443)       [x] Bebeto [phone] x      [] GDWQQ(79729)  [] NMR (51416) x      [] Estim (attended) (49022)   [] Manual ([phone]) x      [] [phone] (05813)  [] TA (17586) x      [] Paraffin (98589)  [] ADL  (04887) x     [x] Splint/L code:     [] Estim (unattended) (72084)  [] Fluidotherapy (49915)  [] DN 1-2 (30739)   [] DN 3+ (66299)  [] Orthotic Mgmt, Subsequent Enc (90805)  [] Orthotic Mgmt & Training (49614)  [] Other:    ASSESSMENT: good tolerance        GOALS:  Patient stated goal:    [x]? Progressing: []? Met: []? Not Met: []? Adjusted     Therapist goals for Patient:   Short Term Goals: To be achieved in: 2 weeks  1. Independent in HEP and progression per patient tolerance, in order to prevent re-injury. []? Progressing: []? Met: []? Not Met: []? Adjusted   2. Patient will have a decrease in pain to facilitate improvement in movement, function, and ADLs as indicated by Functional Deficits. []? Progressing: []? Met: []? Not Met: []? Adjusted     Long Term Goals to be achieved in 8 weeks (through 10/5/20), including patient directed goals to address patient identified performance deficits:  1) Pt to be independent in graded HEP progression with a good level of effort and compliance. []? Progressing: []? Met: []? Not Met: []? Adjusted   2) Pt to report a score of </= 25% on the Quick DASH disability questionnaire for increased performance with carrying, moving, and handling objects. []? Progressing: []? Met: []? Not Met: []? Adjusted   3) Pt will demonstrate increased ROM to L thumb to touch base of small finger for improved independence with dressing and grooming . []? Progressing: []? Met: []? Not Met: []? Adjusted   4) Pt will demonstrate increased strength to L pinch to 50% of R for improved independence with buttoning . []? Progressing: []? Met: []? Not Met: []? Adjusted   5) Pt will have a decrease in pain to 0-1/10 to facilitate work tasks . []? Progressing: []? Met: []? Not Met: []?  Adjusted         Overall Progression Towards Functional Goals/Treatment Progress Update:  [] Patient is progressing as expected towards functional goals listed. [] Progression is slowed due to complexities/impairments listed. [] Progression has been slowed due to co-morbidities. [x] Plan just implemented, too soon to assess goals progression <30 days  [] Goals require adjustment due to lack of progress  [] Patient is not progressing as expected and requires additional follow up with physician  [] All goals are met  [] Other:     Prognosis for POC: [x] Good [] Fair  [] Poor    Patient requires continued skilled intervention: [x] Yes  [] No    Treatment/Activity Tolerance:  [x] Patient able to complete treatment  [] Patient limited by fatigue  [] Patient limited by pain    [] Patient limited by other medical complications  [] Other:                  PLAN: See eval  [x] Continue per plan of care [] Alter current plan (see comments above)  [x] Plan of care initiated [] Hold pending MD visit [] Discharge    Electronically signed by:  Ryan Mcdaniel OTR/L     [place] [facility]      Note: If patient does not return for scheduled/ recommended follow up visits, this note will serve as a discharge from care along with most recent update on progress.

## 2020-08-05 NOTE — PROGRESS NOTES
Assessment: #1 first postop visit after open reduction internal fixation Guzmán's fracture of the left thumb metacarpal.  Sutures removed today she did have a little stitch cellulitis but no evidence of deep space infection but I am going to place her on Keflex for the next week. 2.  Right distal radius fracture which is been casted now for 2 weeks but she also has underlying scapholunate ligament disruption with DISI deformity of the lunate and definite widening of the scapholunate interval.    In discussing her pre-injury symptoms she did have pain on that side of her wrist chronically for which she had been using topical treatments. No definitive history though of previous scapholunate ligament disruption    Treatment Plan: I had a long conversation with Skylar Osman and her sister today both about the rehabilitation of her left thumb and also about the underlying scapholunate ligament injury with her distal radius fracture. What I would suggest we do is go ahead and rehabilitate the left hand where we operated on the Guzmán's fracture and also allow the distal radius fracture to fully heal and then we will consider whether or not to do ligamentous reconstruction on the underlying scapholunate ligament injury. She is having a brace made today for her Guzmán's fracture on the left. She lives out in the [facility] and is probably going to end up seeing Kelli Martinez at [facility] office for her range of motion exercises. Return in about 4 weeks (around 9/2/2020) for X-ray next visit out of cast on right also need x-rays on left with Jesus de Camaces view. Chief Complaint:  Post-Op Check (PO 07/28/20 ORIF Left thumb Guzmán's fracture; Right distal radius fracture- casted )      History of Present Illness  Marcia Roberts is a 72 y.o. female.   Location: left thumb Severity: minimal complaints of pain Duration: 07/28/20 surgery Modifying factors: splint, rest  Associated symptoms: none    Medical History    Current Outpatient Medications on File Prior to Visit   Medication Sig Dispense Refill    acetaminophen (TYLENOL) 500 MG tablet Take 500 mg by mouth every 6 hours as needed for Pain 2 tablets      HYDROcodone-acetaminophen (NORCO) 5-325 MG per tablet Take 1 tablet by mouth every 6 hours as needed for Pain.  pravastatin (PRAVACHOL) 40 MG tablet TAKE ONE TABLET BY MOUTH DAILY 90 tablet 3    citalopram (CELEXA) 10 MG tablet TAKE ONE TABLET BY MOUTH DAILY 90 tablet 3     No current facility-administered medications on file prior to visit. Past Medical History:   Diagnosis Date    Anxiety     Hyperlipidemia      Allergies   Allergen Reactions    Pneumococcal Vaccines Other (See Comments)     Large local reaction    Prednisone Palpitations     dizziness     Social History     Socioeconomic History    Marital status: Single     Spouse name: Not on file    Number of children: Not on file    Years of education: Not on file    Highest education level: Not on file   Occupational History    Not on file   Social Needs    Financial resource strain: Not on file    Food insecurity     Worry: Not on file     Inability: Not on file    Transportation needs     Medical: Not on file     Non-medical: Not on file   Tobacco Use    Smoking status: Former Smoker     Packs/day: 1.00     Years: 10.00     Pack years: 10.00     Last attempt to quit: 1983     Years since quittin.6    Smokeless tobacco: Never Used   Substance and Sexual Activity    Alcohol use:  Yes     Alcohol/week: 0.0 standard drinks     Comment: 0-4 glasses of wine per month    Drug use: No    Sexual activity: Not Currently   Lifestyle    Physical activity     Days per week: Not on file     Minutes per session: Not on file    Stress: Not on file   Relationships    Social connections     Talks on phone: Not on file     Gets together: Not on file     Attends Congregational service: Not on file     Active member of club or organization: Not on file     Attends meetings of clubs or organizations: Not on file     Relationship status: Not on file    Intimate partner violence     Fear of current or ex partner: Not on file     Emotionally abused: Not on file     Physically abused: Not on file     Forced sexual activity: Not on file   Other Topics Concern    Not on file   Social History Narrative    Not on file     Family History   Problem Relation Age of Onset    High Cholesterol Mother     Other Father         colon polyps    Colon Cancer Father     Dementia Father     Other Sister         gerd       Patient's medications, allergies, past medical, surgical, social and family histories were reviewed and updated as appropriate. Review of Systems  Pertinent items are noted in HPI      Vital Signs  There were no vitals filed for this visit. There is no height or weight on file to calculate BMI. Physical Exam well fitted right short arm cast.    On the left she had a little stitch cellulitis we removed her sutures but no significant swelling    Hand Examination:    Additional Comments:     Additional Examinations:  X-Ray Findings: PA lateral and oblique x-rays of the right wrist do show widening of the scapholunate interval with a DC deformity of the lunate. There is been no significant change in the alignment of her distal radius fracture which is slightly impacted and has 0 degrees dorsal tilt on the lateral.    PA lateral oblique x-rays of the left hand with Edgar Dinning view show good position of the thumb metacarpal are hardware is in appropriate position  Additional Diagnostic Test Findings:    Office Procedures: This dictation was performed with a verbal recognition program. It is possible that there are still dictated errors within this office note. All efforts were made to ensure that this office note is accurate.     Orders Placed This Encounter   Procedures    XR WRIST RIGHT (MIN 3 VIEWS)     Standing Status: Future     Number of Occurrences:   1     Standing Expiration Date:   8/5/2021     Order Specific Question:   Reason for exam:     Answer:   Check Right Wrist    [facility] OT - [facility] Occupational Therapy     Referral Priority:   Routine     Referral Type:   Eval and Treat     Referral Reason:   Specialty Services Required     Requested Specialty:   Occupational Therapy     Number of Visits Requested:   1       Attestation: I have reviewed the chief complaint and history of present illness (including ROS and PFSH) and vital documentation by my staff and I agree with their documentation and have added where applicable.

## 2020-08-11 ENCOUNTER — HOSPITAL ENCOUNTER (OUTPATIENT)
Dept: OCCUPATIONAL THERAPY | Age: 70
Setting detail: THERAPIES SERIES
Discharge: HOME | End: 2020-08-11
Payer: MEDICARE

## 2020-08-11 PROCEDURE — 97140 MANUAL THERAPY 1/> REGIONS: CPT | Performed by: OCCUPATIONAL THERAPIST

## 2020-08-11 PROCEDURE — 97530 THERAPEUTIC ACTIVITIES: CPT | Performed by: OCCUPATIONAL THERAPIST

## 2020-08-11 PROCEDURE — 97110 THERAPEUTIC EXERCISES: CPT | Performed by: OCCUPATIONAL THERAPIST

## 2020-08-11 NOTE — FLOWSHEET NOTE
[facility], [facility] [address]  Phone [phone]      Fax [phone]      Occupational Therapy Treatment Note/ Progress Report:     Is this a Progress Report:     []  Yes  [x]  No      If Yes:  Date Range for reporting period:  Beginning 20  Ending  Progress report will be due (10 Rx or 30 days whichever is less): 65    Recertification will be due (POC Duration  / 90 days whichever is less): 10/5/20  Date:  2020  Patient Name:  Luann Chi    :  1950  MRN: [id number]  Medical/Treatment Diagnosis Information:  · Diagnosis: L thumb bennets fracture s/p ORIF L hand stiffness M26.642  ·       Insurance/Certification information:  OT Insurance Information: [employer] Medicare $40 copay 100% Med nec  Physician Information:  Referring Practitioner: Dr. Marcia Shanks  Has the plan of care been signed (Y/N):        []  Yes  [x]  No   Comorbidities Affecting Functional Performance:     [x]Anxiety (F41.9)/Depression (F32.9)   []Diabetes Type 1(E10.65) or 2 (E11.65)   []Rheumatoid Arthritis (M05.9)  []Fibromyalgia (M79.7)  []Neuropathy(G60.9)  []Osteoarthritis(M19.91)  []None   []Other:    Visit # Insurance Allowable Auth Required   2 MN []  Yes []  No    From 20  to 2020    Date of Injury: 20  Date of Surgery: 20   Date of Patient follow up with Physician:   RESTRICTIONS/PRECAUTIONS: FA based thumb spica     Latex Allergy:  [x]No      []Yes  Pacemaker:  [x] No       [] Yes   Preferred Language for Healthcare:   [x]English       []other:   Functional Scale: 82% (Quick DASH)   Date assessed:  2020  Pain Scale: 2/10    SUBJECTIVE: Reports compliance with HEP. OBJECTIVE:       OBJECTIVE:   Date:  Hand Dominance:     [x]? Right    []?  Left 2020      Objective Measures:     PAIN 2/10   Quick DASH 82%                           Digits tip to DPFC in cm WNLS   Thumb ROM MP 15  IP 14   Thumb opposition  R:  L: 2.5 cm    Thumb Radial/Palmar abd ROM R:  L: 45   Wrist ROM Ext/Flex R:  L: 61/74   Rad/Uln dev ROM R:  L: 15/35   Forearm ROM  Sup/pron R:  L:   Elbow ROM Ext/flex R:  L:   Shoulder Flex  Shoulder Abd  Shoulder IR/ER     Edema in cm circumf. MCPJs R:  L: 18 cm    Edema in cm circumf. Wrist R:  L:    strength in lbs R:  L: deferred    Pinch Strengthin lbs: lat  R:  L:   Pinch Strength in lbs:  3 point R:  L:      MMT:      Observations:  (including splints, bandages, incisions, scars): R wrist in a cast           MODALITIES: 8/5/20 8/11/20    Fluidotherapy (27408)      Estim (84127/72114)      Paraffin (46207)      US (69061)      Iontophoresis (77432)      Hot Pack 8'     Cold Pack            INTERVENTIONS:      Therapeutic Exercise (62476)      AROM thumb X 10 each direction IP flex   Th opposition to each digit and each jt of ea digit  Th ext   The abd  Th circumduction  10 x 5 sec hold    AROM wrist  X 10 each direction RD/UD   Wrist ext with fingers ext &  Wrist flex with fingers flexed  10x                 Therapeutic Activity (38240)  Tip of thumb touch 1/2\" high  object placed on ulnar side of hand  10 x      Towel crumble with thumb flex /abd          Manual Therapy (83360)  STM    Scar massage  10'    (IASTM, Dry Needling, manual mobilization)            Neuromuscular Reeducation (93631)                  ADL Training (20656)                  HEP Training/Review See sheet(s) New HEP given with additional exer.      Issued HEP as above see media            Splinting      Lcode:  forearm based thumb spica      Orthotic Mgmt, Subsequent Enc (95993)      Orthotic Mgmt & Training (07344)            Other:        Therapeutic Exercise & NMR:  [x] (00161) Provided verbal/tactile cueing for activities related to strengthening, flexibility, endurance, ROM  for improvements in scapular, scapulothoracic and UE control with self care, reaching, carrying, lifting, house/yardwork, driving/computer work.    [] (16372) Provided verbal/tactile cueing for activities related to improving balance, coordination, kinesthetic sense, posture, motor skill, proprioception  to assist with  scapular, scapulothoracic and UE control with self care, reaching, carrying, lifting, house/yardwork, driving/computer work.     Therapeutic Activities & NMR:    [x] (31465 or 16010) Provided verbal/tactile cueing for activities related to improving balance, coordination, kinesthetic sense, posture, motor skill, proprioception and motor activation to allow for proper function of scapular, scapulothoracic and UE control with self care, carrying, lifting, driving/computer work    Home Exercise Program:    [x] (84067) Reviewed/Progressed HEP activities related to strengthening, flexibility, endurance, ROM of scapular, scapulothoracic and UE control with self care, reaching, carrying, lifting, house/yardwork, driving/computer work  [] (13386) Reviewed/Progressed HEP activities related to improving balance, coordination, kinesthetic sense, posture, motor skill, proprioception of scapular, scapulothoracic and UE control with self care, reaching, carrying, lifting, house/yardwork, driving/computer work      Manual Treatments:  PROM / STM / Oscillations-Mobs:  G-I, II, III, IV (PA's, Inf., Post.)  [x] (50666) Provided manual therapy to mobilize soft tissue/joints of cervical/CT, scapular GHJ and UE for the purpose of modulating pain, promoting relaxation,  increasing ROM, reducing/eliminating soft tissue swelling/inflammation/restriction, improving soft tissue extensibility and allowing for proper ROM for normal function with self care, reaching, carrying, lifting, house/yardwork, driving/computer work    ADL Training:  [] (69660) Provided self-care/home management training related to activities of daily living and compensatory training, and/or use of adaptive equipment      Charges:  Timed Code Treatment Minutes: 45   Total Treatment Minutes: 45   Worker's Comp: Time In/Time Out     [] EVAL (LOW) 20189 (typically 20 minutes face-to-face)    [] EVAL (MOD) 43741 (typically 30 minutes face-to-face)  [] EVAL (HIGH) 51846 (typically 45 minutes face-to-face)  [] OT Re-eval (44729)       [x] Bebeto (15541) x      [] JYFYC(35380)  [] NMR (57125) x      [] Estim (attended) (16519)   [x] Manual (49164 [facility]) x 1     [] US (54428)  [x] TA (89951) x      [] Paraffin (18954)  [] ADL  (90108) x     [] Splint/L code:     [] Estim (unattended) (88738)  [] Fluidotherapy (56491)  [] DN 1-2 (85278)   [] DN 3+ (46141)  [] Orthotic Mgmt, Subsequent Enc (65056)  [] Orthotic Mgmt & Training (22769)  [] Other:    ASSESSMENT:  Minimal c/o pain. Scar is sensitive, however pt was able to tolerate scar massage. GOALS:  Patient stated goal:   Regain full function of both hands and R wrist  []? Progressing: []? Met: []? Not Met: []? Adjusted     Therapist goals for Patient:   Short Term Goals: To be achieved in: 2 weeks  1. Independent in HEP and progression per patient tolerance, in order to prevent re-injury. [x]? Progressing: []? Met: []? Not Met: []? Adjusted   2. Patient will have a decrease in pain to facilitate improvement in movement, function, and ADLs as indicated by Functional Deficits. [x]? Progressing: []? Met: []? Not Met: []? Adjusted     Long Term Goals to be achieved in 8 weeks (through 10/5/20), including patient directed goals to address patient identified performance deficits:  1) Pt to be independent in graded HEP progression with a good level of effort and compliance. [x]? Progressing: []? Met: []? Not Met: []? Adjusted   2) Pt to report a score of </= 25% on the Quick DASH disability questionnaire for increased performance with carrying, moving, and handling objects. []? Progressing: []? Met: [x]? Not Met: []?  Adjusted   3) Pt will demonstrate increased ROM to L thumb to touch base of small finger for improved independence with dressing and grooming . [x]? Progressing: []? Met: []? Not Met: []? Adjusted   4) Pt will demonstrate increased strength to L pinch to 50% of R for improved independence with buttoning . []? Progressing: []? Met: [x]? Not Met: []? Adjusted   5) Pt will have a decrease in pain to 0-1/10 to facilitate work tasks . [x]? Progressing: []? Met: []? Not Met: []? Adjusted         Overall Progression Towards Functional Goals/Treatment Progress Update:  [] Patient is progressing as expected towards functional goals listed. [] Progression is slowed due to complexities/impairments listed. [] Progression has been slowed due to co-morbidities. [x] Plan just implemented, too soon to assess goals progression <30 days  [] Goals require adjustment due to lack of progress  [] Patient is not progressing as expected and requires additional follow up with physician  [] All goals are met  [] Other:     Prognosis for POC: [x] Good [] Fair  [] Poor    Patient requires continued skilled intervention: [x] Yes  [] No    Treatment/Activity Tolerance:  [x] Patient able to complete treatment  [] Patient limited by fatigue  [] Patient limited by pain    [] Patient limited by other medical complications  [] Other:                  PLAN: See eval  [x] Continue per plan of care [] Alter current plan (see comments above)  [] Plan of care initiated [] Hold pending MD visit [] Discharge    Electronically signed by: Santino Strange OTR/L ALISSA  OT [id number]          Note: If patient does not return for scheduled/ recommended follow up visits, this note will serve as a discharge from care along with most recent update on progress.

## 2020-08-12 ENCOUNTER — TELEPHONE (OUTPATIENT)
Dept: FAMILY MEDICINE CLINIC | Age: 70
End: 2020-08-12

## 2020-08-12 NOTE — TELEPHONE ENCOUNTER
----- Message from Brittney Montero sent at 8/12/2020 10:25 AM EDT -----  Subject: Message to Provider    QUESTIONS  Information for Provider? natalie with [employer] wants to know if the   doctor would consider the patient a good candidate bone screen also would   like to verify the last bone screen and if she is on any osteosclerosis   sclerosis medication. moy phone number is [phone]  ---------------------------------------------------------------------------  --------------  CALL BACK INFO  What is the best way for the office to contact you? OK to leave message on   voicemail  Preferred Call Back Phone Number? [phone]  ---------------------------------------------------------------------------  --------------  SCRIPT ANSWERS  Relationship to Patient? Other  Representative Name? [employer]  Is the Representative on the appropriate HIPAA document in Epic?  Yes

## 2020-08-13 ENCOUNTER — HOSPITAL ENCOUNTER (OUTPATIENT)
Dept: OCCUPATIONAL THERAPY | Age: 70
Setting detail: THERAPIES SERIES
Discharge: HOME | End: 2020-08-13
Payer: MEDICARE

## 2020-08-13 PROCEDURE — 97140 MANUAL THERAPY 1/> REGIONS: CPT | Performed by: OCCUPATIONAL THERAPIST

## 2020-08-13 PROCEDURE — 97110 THERAPEUTIC EXERCISES: CPT | Performed by: OCCUPATIONAL THERAPIST

## 2020-08-13 PROCEDURE — 97530 THERAPEUTIC ACTIVITIES: CPT | Performed by: OCCUPATIONAL THERAPIST

## 2020-08-13 NOTE — FLOWSHEET NOTE
[facility], [place] [address]  Phone [phone]      Fax [phone]      Occupational Therapy Treatment Note/ Progress Report:     Is this a Progress Report:     []  Yes  [x]  No      If Yes:  Date Range for reporting period:  Beginning 20  Ending  Progress report will be due (10 Rx or 30 days whichever is less): 52    Recertification will be due (POC Duration  / 90 days whichever is less): 10/5/20  Date:  2020  Patient Name:  Tia Gallagher    :  1950  MRN: [id number]  Medical/Treatment Diagnosis Information:  · Diagnosis: L thumb bennets fracture s/p ORIF L hand stiffness M26.642  ·       Insurance/Certification information:  OT Insurance Information: [employer] $40 copay 100% Med nec  Physician Information:  Referring Practitioner: Dr. Serena Padilla  Has the plan of care been signed (Y/N):        []  Yes  [x]  No   Comorbidities Affecting Functional Performance:     [x]Anxiety (F41.9)/Depression (F32.9)   []Diabetes Type 1(E10.65) or 2 (E11.65)   []Rheumatoid Arthritis (M05.9)  []Fibromyalgia (M79.7)  []Neuropathy(G60.9)  []Osteoarthritis(M19.91)  []None   []Other:    Visit # Insurance Allowable Auth Required   3 [place] []  Yes []  No    From 20  to 2020    Date of Injury: 20  Date of Surgery: 20   Date of Patient follow up with Physician:   RESTRICTIONS/PRECAUTIONS: FA based thumb spica     Latex Allergy:  [x]No      []Yes  Pacemaker:  [x] No       [] Yes   Preferred Language for Healthcare:   [x]English       []other:   Functional Scale: 82% (Quick DASH)   Date assessed:  2020  Pain Scale: 2/10    SUBJECTIVE: picking up heavy things is difficult. Doing well with self care items              OBJECTIVE:   Date:  Hand Dominance:     [x]? Right    []?  Left 2020    Objective Measures:      PAIN 2/10 2/10    Quick DASH 82%                             Digits tip to DPFC in cm WNLS    Thumb ROM MP 15  IP 14 MP 25  IP 59   Thumb opposition  R:  L: 2.5 cm     Thumb Radial/Palmar abd ROM R:  L: 45    Wrist ROM Ext/Flex R:  L: 61/74   L: 75/75   Rad/Uln dev ROM R:  L: 15/35    Forearm ROM  Sup/pron R:  L:    Elbow ROM Ext/flex R:  L:    Shoulder Flex  Shoulder Abd  Shoulder IR/ER      Edema in cm circumf. MCPJs R:  L: 18 cm     Edema in cm circumf. Wrist R:  L:     strength in lbs R:  L: deferred     Pinch Strengthin lbs: lat  R:  L:    Pinch Strength in lbs:  3 point R:  L:       MMT:       Observations:  (including splints, bandages, incisions, scars): R wrist in a cast            MODALITIES: 8/5/20 8/11/20 8/13/20    Fluidotherapy (39527)      Estim (26486/16954)      Paraffin (89661)      US (45742)      Iontophoresis (49171)      Hot Pack 8'     Cold Pack            INTERVENTIONS:      Therapeutic Exercise (44789)      AROM thumb X 10 each direction IP flex   Th opposition to each digit and each jt of ea digit  Th ext   The abd  Th circumduction  10 x 5 sec hold X 10    X 10     X 10       X 10    AROM wrist  X 10 each direction RD/UD   Wrist ext with fingers ext &  Wrist flex with fingers flexed  10x  X 10 EACH       Yellow digiflex x 20          Therapeutic Activity (14511)  Tip of thumb touch 1/2\" high  object placed on ulnar side of hand  10 x LARGE pegboard    Manip golf balls      Towel crumble with thumb flex /abd          Manual Therapy (44167)  STM    Scar massage  10' STM    Scar mass 10   (IASTM, Dry Needling, manual mobilization)            Neuromuscular Reeducation (84741)                  ADL Training (33544)                  HEP Training/Review See sheet(s) New HEP given with additional exer.      Issued HEP as above see media            Splinting      Lcode:  forearm based thumb spica      Orthotic Mgmt, Subsequent Enc (34446)      Orthotic Mgmt & Training (71497)            Other:        Therapeutic Exercise & NMR:  [x] (06971) Provided verbal/tactile cueing for activities related to strengthening, flexibility, endurance, ROM  for improvements in scapular, scapulothoracic and UE control with self care, reaching, carrying, lifting, house/yardwork, driving/computer work.    [] (45515) Provided verbal/tactile cueing for activities related to improving balance, coordination, kinesthetic sense, posture, motor skill, proprioception  to assist with  scapular, scapulothoracic and UE control with self care, reaching, carrying, lifting, house/yardwork, driving/computer work.     Therapeutic Activities & NMR:    [x] (72259 or 04891) Provided verbal/tactile cueing for activities related to improving balance, coordination, kinesthetic sense, posture, motor skill, proprioception and motor activation to allow for proper function of scapular, scapulothoracic and UE control with self care, carrying, lifting, driving/computer work    Home Exercise Program:    [x] (15547) Reviewed/Progressed HEP activities related to strengthening, flexibility, endurance, ROM of scapular, scapulothoracic and UE control with self care, reaching, carrying, lifting, house/yardwork, driving/computer work  [] (68500) Reviewed/Progressed HEP activities related to improving balance, coordination, kinesthetic sense, posture, motor skill, proprioception of scapular, scapulothoracic and UE control with self care, reaching, carrying, lifting, house/yardwork, driving/computer work      Manual Treatments:  PROM / STM / Oscillations-Mobs:  G-I, II, III, IV (PA's, Inf., Post.)  [x] (73226) Provided manual therapy to mobilize soft tissue/joints of cervical/CT, scapular GHJ and UE for the purpose of modulating pain, promoting relaxation,  increasing ROM, reducing/eliminating soft tissue swelling/inflammation/restriction, improving soft tissue extensibility and allowing for proper ROM for normal function with self care, reaching, carrying, lifting, house/yardwork, driving/computer work    ADL Training:  [] (64330) Provided self-care/home management training related to activities of daily living and compensatory training, and/or use of adaptive equipment      Charges:  Timed Code Treatment Minutes: 38   Total Treatment Minutes: 38   Worker's Comp: Time In/Time Out     [] EVAL (LOW) 68857 (typically 20 minutes face-to-face)    [] EVAL (MOD) 38436 (typically 30 minutes face-to-face)  [] EVAL (HIGH) 22775 (typically 45 minutes face-to-face)  [] OT Re-eval (68154)       [x] Bebeto [phone]) x  1    [] BOEZP(96998)  [] NMR (33665) x      [] Estim (attended) (21573)   [x] Manual (19369 [facility]) x 1     [] US (45620)  [x] TA (78812) x   1   [] Paraffin (06482)  [] ADL  (98773) x     [] Splint/L code:     [] Estim (unattended) (20319)  [] Fluidotherapy (64887)  [] DN 1-2 (54165)   [] DN 3+ (00492)  [] Orthotic Mgmt, Subsequent Enc (09653)  [] Orthotic Mgmt & Training (72363)  [] Other:    ASSESSMENT:  Minimal c/o pain. Issued scar pad for under splint       GOALS:  Patient stated goal:   Regain full function of both hands and R wrist  []? Progressing: []? Met: []? Not Met: []? Adjusted     Therapist goals for Patient:   Short Term Goals: To be achieved in: 2 weeks  1. Independent in HEP and progression per patient tolerance, in order to prevent re-injury. [x]? Progressing: []? Met: []? Not Met: []? Adjusted   2. Patient will have a decrease in pain to facilitate improvement in movement, function, and ADLs as indicated by Functional Deficits. [x]? Progressing: []? Met: []? Not Met: []? Adjusted     Long Term Goals to be achieved in 8 weeks (through 10/5/20), including patient directed goals to address patient identified performance deficits:  1) Pt to be independent in graded HEP progression with a good level of effort and compliance. [x]? Progressing: []? Met: []? Not Met: []?  Adjusted   2) Pt to report a score of </= 25% on the Quick DASH disability questionnaire for increased performance with carrying, moving, and handling objects. []? Progressing: []? Met: [x]? Not Met: []? Adjusted   3) Pt will demonstrate increased ROM to L thumb to touch base of small finger for improved independence with dressing and grooming . [x]? Progressing: []? Met: []? Not Met: []? Adjusted   4) Pt will demonstrate increased strength to L pinch to 50% of R for improved independence with buttoning . []? Progressing: []? Met: [x]? Not Met: []? Adjusted   5) Pt will have a decrease in pain to 0-1/10 to facilitate work tasks . [x]? Progressing: []? Met: []? Not Met: []? Adjusted         Overall Progression Towards Functional Goals/Treatment Progress Update:  [] Patient is progressing as expected towards functional goals listed. [] Progression is slowed due to complexities/impairments listed. [] Progression has been slowed due to co-morbidities. [x] Plan just implemented, too soon to assess goals progression <30 days  [] Goals require adjustment due to lack of progress  [] Patient is not progressing as expected and requires additional follow up with physician  [] All goals are met  [] Other:     Prognosis for POC: [x] Good [] Fair  [] Poor    Patient requires continued skilled intervention: [x] Yes  [] No    Treatment/Activity Tolerance:  [x] Patient able to complete treatment  [] Patient limited by fatigue  [] Patient limited by pain    [] Patient limited by other medical complications  [] Other:                  PLAN: See eval  [x] Continue per plan of care [] Alter current plan (see comments above)  [] Plan of care initiated [] Hold pending MD visit [] Discharge    Electronically signed by:  Rachel Joshi OTR/ANUP  [id number]        Note: If patient does not return for scheduled/ recommended follow up visits, this note will serve as a discharge from care along with most recent update on progress.

## 2020-08-14 NOTE — TELEPHONE ENCOUNTER
Morris Santillan advised of info  I will call pt and order dexa scan.   Insurance covers at no cost every 2 years

## 2020-08-18 ENCOUNTER — HOSPITAL ENCOUNTER (OUTPATIENT)
Dept: OCCUPATIONAL THERAPY | Age: 70
Setting detail: THERAPIES SERIES
Discharge: HOME | End: 2020-08-18
Payer: MEDICARE

## 2020-08-18 PROCEDURE — 97110 THERAPEUTIC EXERCISES: CPT | Performed by: OCCUPATIONAL THERAPIST

## 2020-08-18 PROCEDURE — 97530 THERAPEUTIC ACTIVITIES: CPT | Performed by: OCCUPATIONAL THERAPIST

## 2020-08-18 PROCEDURE — 97140 MANUAL THERAPY 1/> REGIONS: CPT | Performed by: OCCUPATIONAL THERAPIST

## 2020-08-18 NOTE — FLOWSHEET NOTE
8/13/20 8/18/20   Objective Measures:       PAIN 2/10 2/10  2/10 with th abd   Quick DASH 82%                                     Digits tip to DPFC in cm WNLS     Thumb ROM MP 15  IP 14 MP 25  IP 59    Thumb opposition  R:  L: 2.5 cm      Thumb Radial/Palmar abd ROM R:  L: 45      L 50   Wrist ROM Ext/Flex R:  L: 61/74   L: 75/75    Rad/Uln dev ROM R:  L: 15/35     Forearm ROM  Sup/pron R:  L:     Elbow ROM Ext/flex R:  L:     Shoulder Flex  Shoulder Abd  Shoulder IR/ER       Edema in cm circumf. MCPJs R:  L: 18 cm      Edema in cm circumf.   Wrist R:  L:      strength in lbs R:  L: deferred      Pinch Strengthin lbs: lat  R:  L:     Pinch Strength in lbs:  3 point R:  L:        MMT:        Observations:  (including splints, bandages, incisions, scars): R wrist in a cast             MODALITIES: 8/5/20 8/11/20 8/13/20 8/18/20   Fluidotherapy (29053)       Estim (83329/58830)       Paraffin (44179)       US (86627)       Iontophoresis (94005)       Hot Pack 8'      Cold Pack              INTERVENTIONS:       Therapeutic Exercise (65043)       AROM thumb X 10 each direction IP flex   Th opposition to each digit and each jt of ea digit  Th ext   The abd  Th circumduction  10 x 5 sec hold X 10    X 10     X 10       X 10  IP flex   Th opposition to each digit and each jt of ea digit  Th ext   The abd  Th circumduction  10 x 5 sec hold   AROM wrist  X 10 each direction RD/UD   Wrist ext with fingers ext &  Wrist flex with fingers flexed  10x  X 10 EACH  RD/UD   Wrist ext with fingers ext &  Wrist flex with fingers flexed  10x       Yellow digiflex x 20  Scapular squeezes  10 x2          Therapeutic Activity (96288)  Tip of thumb touch 1/2\" high  object placed on ulnar side of hand  10 x LARGE pegboard    Manip golf balls  LARGE pegboard    Th abd with foam cube    Th flex/abd with towel crumble - stopped due to pain     Towel crumble with thumb flex /abd            Manual Therapy (28806)  STM    Scar massage  10' STM    Scar mass 10 [place]   Dorsal FA and hand    Massage to tight web space   (IASTM, Dry Needling, manual mobilization)    k tape th  and dorsal FA  For scar and sensitivity. Neuromuscular Reeducation (87956)                     ADL Training (85369)    Pt having back pain and FA discomfort with typing. Educated regarding usage of towel roll for lumbar area  And under FA . HEP Training/Review See sheet(s) New HEP given with additional exer. Issued HEP as above see media              Splinting       Lcode:  forearm based thumb spica       Orthotic Mgmt, Subsequent Enc (40461)       Orthotic Mgmt & Training (66164)              Other:         Therapeutic Exercise & NMR:  [x] (13417) Provided verbal/tactile cueing for activities related to strengthening, flexibility, endurance, ROM  for improvements in scapular, scapulothoracic and UE control with self care, reaching, carrying, lifting, house/yardwork, driving/computer work.    [] (99056) Provided verbal/tactile cueing for activities related to improving balance, coordination, kinesthetic sense, posture, motor skill, proprioception  to assist with  scapular, scapulothoracic and UE control with self care, reaching, carrying, lifting, house/yardwork, driving/computer work.     Therapeutic Activities & NMR:    [x] (30647 or 20217) Provided verbal/tactile cueing for activities related to improving balance, coordination, kinesthetic sense, posture, motor skill, proprioception and motor activation to allow for proper function of scapular, scapulothoracic and UE control with self care, carrying, lifting, driving/computer work    Home Exercise Program:    [x] (02247) Reviewed/Progressed HEP activities related to strengthening, flexibility, endurance, ROM of scapular, scapulothoracic and UE control with self care, reaching, carrying, lifting, house/yardwork, driving/computer work  [] (67833) Reviewed/Progressed HEP activities related to improving balance, coordination, kinesthetic sense, posture, motor skill, proprioception of scapular, scapulothoracic and UE control with self care, reaching, carrying, lifting, house/yardwork, driving/computer work      Manual Treatments:  PROM / STM / Oscillations-Mobs:  G-I, II, III, IV (PA's, Inf., Post.)  [x] (00896) Provided manual therapy to mobilize soft tissue/joints of cervical/CT, scapular GHJ and UE for the purpose of modulating pain, promoting relaxation,  increasing ROM, reducing/eliminating soft tissue swelling/inflammation/restriction, improving soft tissue extensibility and allowing for proper ROM for normal function with self care, reaching, carrying, lifting, house/yardwork, driving/computer work    ADL Training:  [] (57865) Provided self-care/home management training related to activities of daily living and compensatory training, and/or use of adaptive equipment      Charges:  Timed Code Treatment Minutes: 46   Total Treatment Minutes: 46   Worker's Comp: Time In/Time Out     [] EVAL (LOW) 21579 (typically 20 minutes face-to-face)    [] EVAL (MOD) 46224 (typically 30 minutes face-to-face)  [] EVAL (HIGH) 42810 (typically 45 minutes face-to-face)  [] OT Re-eval (47562)       [x] Bebeto (G445856) x  1    [] UJJTD(63089)  [] NMR (68606) x      [] Estim (attended) (26217)   [x] Manual ([id number]) x 1     [] US (53144)  [x] TA (13336) x   1   [] Paraffin (36690)  [] ADL  (79129) x     [] Splint/L code:     [] Estim (unattended) (28314)  [] Fluidotherapy (66986)  [] DN 1-2 (11611)   [] DN 3+ (72003)  [] Orthotic Mgmt, Subsequent Enc (41695)  [] Orthotic Mgmt & Training (03509)  [] Other:    ASSESSMENT:  Splint cont to bother pt on dorsum of thumb, thus applied k tape. Pt felt instruction on positioning while typing was helpful. GOALS:  Patient stated goal:   Regain full function of both hands and R wrist  [x]? Progressing: []? Met: []? Not Met: []?  Adjusted     Therapist goals for Patient:   Short Term Goals: To be achieved in: 2 weeks  1. Independent in HEP and progression per patient tolerance, in order to prevent re-injury. [x]? Progressing: []? Met: []? Not Met: []? Adjusted   2. Patient will have a decrease in pain to facilitate improvement in movement, function, and ADLs as indicated by Functional Deficits. [x]? Progressing: []? Met: []? Not Met: []? Adjusted     Long Term Goals to be achieved in 8 weeks (through 10/5/20), including patient directed goals to address patient identified performance deficits:  1) Pt to be independent in graded HEP progression with a good level of effort and compliance. [x]? Progressing: []? Met: []? Not Met: []? Adjusted   2) Pt to report a score of </= 25% on the Quick DASH disability questionnaire for increased performance with carrying, moving, and handling objects. []? Progressing: []? Met: [x]? Not Met: []? Adjusted   3) Pt will demonstrate increased ROM to L thumb to touch base of small finger for improved independence with dressing and grooming . [x]? Progressing: []? Met: []? Not Met: []? Adjusted   4) Pt will demonstrate increased strength to L pinch to 50% of R for improved independence with buttoning . []? Progressing: []? Met: [x]? Not Met: []? Adjusted   5) Pt will have a decrease in pain to 0-1/10 to facilitate work tasks . [x]? Progressing: []? Met: []? Not Met: []? Adjusted         Overall Progression Towards Functional Goals/Treatment Progress Update:  [] Patient is progressing as expected towards functional goals listed. [] Progression is slowed due to complexities/impairments listed. [] Progression has been slowed due to co-morbidities.   [x] Plan just implemented, too soon to assess goals progression <30 days  [] Goals require adjustment due to lack of progress  [] Patient is not progressing as expected and requires additional follow up with physician  [] All goals are met  [] Other:     Prognosis for POC: [x] Good [] Fair  [] Poor    Patient requires continued skilled intervention: [x] Yes  [] No    Treatment/Activity Tolerance:  [x] Patient able to complete treatment  [] Patient limited by fatigue  [] Patient limited by pain    [] Patient limited by other medical complications  [] Other:                  PLAN: Progress per MD recommendations  [x] Continue per plan of care [] Alter current plan (see comments above)  [] Plan of care initiated [] Hold pending MD visit [] Discharge    Electronically signed by: Cj Farooq OTR/L CHT  [id number]        Note: If patient does not return for scheduled/ recommended follow up visits, this note will serve as a discharge from care along with most recent update on progress.

## 2020-08-20 ENCOUNTER — HOSPITAL ENCOUNTER (OUTPATIENT)
Dept: OCCUPATIONAL THERAPY | Age: 70
Setting detail: THERAPIES SERIES
Discharge: HOME | End: 2020-08-20
Payer: MEDICARE

## 2020-08-20 PROCEDURE — 97140 MANUAL THERAPY 1/> REGIONS: CPT | Performed by: OCCUPATIONAL THERAPIST

## 2020-08-20 PROCEDURE — 97110 THERAPEUTIC EXERCISES: CPT | Performed by: OCCUPATIONAL THERAPIST

## 2020-08-20 PROCEDURE — 97530 THERAPEUTIC ACTIVITIES: CPT | Performed by: OCCUPATIONAL THERAPIST

## 2020-08-20 NOTE — FLOWSHEET NOTE
[facility], [facility] [place] [address]  Phone [phone]      Fax [phone]      Occupational Therapy Treatment Note/ Progress Report:     Is this a Progress Report:     []  Yes  [x]  No      If Yes:  Date Range for reporting period:  Beginning 20  Ending  Progress report will be due (10 Rx or 30 days whichever is less):     Recertification will be due (POC Duration  / 90 days whichever is less): 10/5/20  Date:  2020  Patient Name:  Mihaela Lopez    :  1950  MRN: [id number]  Medical/Treatment Diagnosis Information:  · Diagnosis: L thumb bennets fracture s/p ORIF   · L hand stiffness M26.642  ·       Insurance/Certification information:  OT Insurance Information: A[employer]na Medicare $40 copay 100% Med nec  Physician Information:  Referring Practitioner: Dr. Christina Solitario  Has the plan of care been signed (Y/N):        []  Yes  [x]  No   Comorbidities Affecting Functional Performance:     [x]Anxiety (F41.9)/Depression (F32.9)   []Diabetes Type 1(E10.65) or 2 (E11.65)   []Rheumatoid Arthritis (M05.9)  []Fibromyalgia (M79.7)  []Neuropathy(G60.9)  []Osteoarthritis(M19.91)  []None   []Other:    Visit # Insurance Allowable Auth Required   4 MN []  Yes []  No    From 20  to 2020    Date of Injury: 20  Date of Surgery: 20   Date of Patient follow up with Physician:   RESTRICTIONS/PRECAUTIONS: FA based thumb spica     Latex Allergy:  [x]No      []Yes  Pacemaker:  [x] No       [] Yes   Preferred Language for Healthcare:   [x]English       []other:   Functional Scale: 82% (Quick DASH)   Date assessed:  2020     Pain Scale: 2/10  Current:     SUBJECTIVE: Pain 5/10 night following last session with K tape in FA. Pain     Cont to have am stiffness. Gel pad is helping with splint. Th opposition is painful    picking up heavy things is difficult.  Doing well with self care items OBJECTIVE:   Date:  Hand Dominance:     [x]? Right    []? Left 8/5/2020 8/13/20 8/18/20 8/20/20  3.5 wks   Objective Measures:        PAIN 2/10 2/10  2/10 with th abd 2/10   Quick DASH 82%                                          Digits tip to DPFC in cm WNLS      Thumb ROM MP 15  IP 14 MP 25  IP 59  30  55   Thumb opposition  R:  L: 2.5 cm       Thumb Radial/Palmar abd ROM R:  L: 45      L 50   L 50   Wrist ROM Ext/Flex R:  L: 61/74   L: 75/75  L 80/75   Rad/Uln dev ROM R:  L: 15/35     L20/35   Forearm ROM  Sup/pron R:  L:      Elbow ROM Ext/flex/ R:  L:      Should/er Flex  Shoulder Abd  Shoulder IR/ER        Edema in cm circumf. MCPJs R:  L: 18 cm       Edema in cm circumf.   Wrist R:  L:       strength in lbs/ R:  L: deferred       Pinch Strengthin lbs: lat  R:  L:      Pinch Strength in lbs:  3 point/ R:  L:         MMT:         Observations:  (including splints, bandages, incisions, scars): R wrist in a cast              MODALITIES: 8/5/20 8/11/20 8/13/20 8/18/20 8/20/20   Fluidotherapy (17339)        Estim ([id number])        Paraffin (61804)        US (13059)        Iontophoresis (34009)        Hot Pack 8'       Cold Pack                INTERVENTIONS:        Therapeutic Exercise (24778)        AROM thumb X 10 each direction IP flex   Th opposition to each digit and each jt of ea digit  Th ext   The abd  Th circumduction  10 x 5 sec hold X 10    X 10     X 10       X 10  IP flex   Th opposition to each digit and each jt of ea digit  Th ext   The abd  Th circumduction  10 x 5 sec hold Th opposition to each digit and each jt of ea digit  Th ext   Th abd  10x   AROM wrist  X 10 each direction RD/UD   Wrist ext with fingers ext &  Wrist flex with fingers flexed  10x  X 10 EACH  RD/UD   Wrist ext with fingers ext &  Wrist flex with fingers flexed  10x  Wrist ext with fingers ext &  Wrist flex with fingers flexed    RD/UD  10x       Yellow digiflex x 20  Scapular squeezes  10 x2 Th flex picking up beads placed on ulnar side of hand at middle crease of SF        Rope wristciser  5x   Therapeutic Activity (91600)  Tip of thumb touch 1/2\" high  object placed on ulnar side of hand  10 x LARGE pegboard    Manip golf balls  LARGE pegboard    Th abd with foam cube    Th flex/abd with towel crumble - stopped due to pain Th abd with bead    Gripping cones at large end and stacking. 8x3         Towel crumble with thumb flex /abd              Manual Therapy (72331)  STM    Scar massage  10' STM    Scar mass 10 Hawks   Dorsal FA and hand    Massage to tight web space Hawks   Dorsal FA and hand   (IASTM, Dry Needling, manual mobilization)    k tape th  and dorsal FA  For scar and sensitivity. Neuromuscular Reeducation (89664)                        ADL Training (48584)    Pt having back pain and FA discomfort with typing. Educated regarding usage of towel roll for lumbar area  And under FA . HEP Training/Review See sheet(s) New HEP given with additional exer. Issued HEP as above see media                Splinting        Lcode:  forearm based thumb spica        Orthotic Mgmt, Subsequent Enc (65398)        Orthotic Mgmt & Training (71152)                Other:          Therapeutic Exercise & NMR:  [x] (13548) Provided verbal/tactile cueing for activities related to strengthening, flexibility, endurance, ROM  for improvements in scapular, scapulothoracic and UE control with self care, reaching, carrying, lifting, house/yardwork, driving/computer work. [x] (94598) Provided verbal/tactile cueing for activities related to improving balance, coordination, kinesthetic sense, posture, motor skill, proprioception  to assist with  scapular, scapulothoracic and UE control with self care, reaching, carrying, lifting, house/yardwork, driving/computer work.     Therapeutic Activities & NMR:    [x] (29169 or 78845) Provided verbal/tactile cueing for activities related to improving balance, coordination, kinesthetic sense, posture, motor skill, proprioception and motor activation to allow for proper function of scapular, scapulothoracic and UE control with self care, carrying, lifting, driving/computer work    Home Exercise Program:    [x] (08902) Reviewed/Progressed HEP activities related to strengthening, flexibility, endurance, ROM of scapular, scapulothoracic and UE control with self care, reaching, carrying, lifting, house/yardwork, driving/computer work  [] (15937) Reviewed/Progressed HEP activities related to improving balance, coordination, kinesthetic sense, posture, motor skill, proprioception of scapular, scapulothoracic and UE control with self care, reaching, carrying, lifting, house/yardwork, driving/computer work      Manual Treatments:  PROM / STM / Oscillations-Mobs:  G-I, II, III, IV (PA's, Inf., Post.)  [x] (62950) Provided manual therapy to mobilize soft tissue/joints of cervical/CT, scapular GHJ and UE for the purpose of modulating pain, promoting relaxation,  increasing ROM, reducing/eliminating soft tissue swelling/inflammation/restriction, improving soft tissue extensibility and allowing for proper ROM for normal function with self care, reaching, carrying, lifting, house/yardwork, driving/computer work    ADL Training:  [] (23446) Provided self-care/home management training related to activities of daily living and compensatory training, and/or use of adaptive equipment      Charges:  Timed Code Treatment Minutes: 44   Total Treatment Minutes: 44   Worker's Comp: Time In/Time Out     [] EVAL (LOW) 34766 (typically 20 minutes face-to-face)    [] EVAL (MOD) 32426 (typically 30 minutes face-to-face)  [] EVAL (HIGH) 59136 (typically 45 minutes face-to-face)  [] OT Re-eval (06138)       [x] Bebeto ([id number]) x  1    [] IZCQJ(24451)  [] NMR (09519) x      [] Estim (attended) (84748)   [x] Manual ([phone]) x 1     [] US (86336)  [x] TA (59077) x   1   [] Paraffin (44689)  [] ADL (82727) x     [] Splint/L code:     [] Estim (unattended) (43087)  [] Fluidotherapy (79982)  [] DN 1-2 (22106)   [] DN 3+ (95867)  [] Orthotic Mgmt, Subsequent Enc (45224)  [] Orthotic Mgmt & Training (46276)  [] Other:    ASSESSMENT: K tape assisted with irritation of thumb in splint, however pt reported increased stiffness in thumb and discomfort in FA. Will not repeat k taping at this time. Pt. Tolerated therapy session well and reported she felt good by the end of the session      GOALS:  Patient stated goal:   Regain full function of both hands and R wrist  [x]? Progressing: []? Met: []? Not Met: []? Adjusted     Therapist goals for Patient:   Short Term Goals: To be achieved in: 2 weeks  1. Independent in HEP and progression per patient tolerance, in order to prevent re-injury. [x]? Progressing: []? Met: []? Not Met: []? Adjusted   2. Patient will have a decrease in pain to facilitate improvement in movement, function, and ADLs as indicated by Functional Deficits. [x]? Progressing: []? Met: []? Not Met: []? Adjusted     Long Term Goals to be achieved in 8 weeks (through 10/5/20), including patient directed goals to address patient identified performance deficits:  1) Pt to be independent in graded HEP progression with a good level of effort and compliance. [x]? Progressing: []? Met: []? Not Met: []? Adjusted   2) Pt to report a score of </= 25% on the Quick DASH disability questionnaire for increased performance with carrying, moving, and handling objects. []? Progressing: []? Met: [x]? Not Met: []? Adjusted   3) Pt will demonstrate increased ROM to L thumb to touch base of small finger for improved independence with dressing and grooming . [x]? Progressing: []? Met: []? Not Met: []? Adjusted   4) Pt will demonstrate increased strength to L pinch to 50% of R for improved independence with buttoning . []? Progressing: []? Met: [x]? Not Met: []?  Adjusted   5) Pt will have a decrease in pain to 0-1/10 to facilitate work tasks . [x]? Progressing: []? Met: []? Not Met: []? Adjusted         Overall Progression Towards Functional Goals/Treatment Progress Update:  [x] Patient is progressing as expected towards functional goals listed. [x] Progression is slowed due to complexities/impairments listed. - R UE in cast and L in splint  [] Progression has been slowed due to co-morbidities. [] Plan just implemented, too soon to assess goals progression <30 days  [] Goals require adjustment due to lack of progress  [] Patient is not progressing as expected and requires additional follow up with physician  [] All goals are met  [] Other:     Prognosis for POC: [x] Good [] Fair  [] Poor    Patient requires continued skilled intervention: [x] Yes  [] No    Treatment/Activity Tolerance:  [x] Patient able to complete treatment  [] Patient limited by fatigue  [] Patient limited by pain    [] Patient limited by other medical complications  [] Other:                  PLAN: Progress per MD recommendations  [x] Continue per plan of care [] Alter current plan (see comments above)  [] Plan of care initiated [] Hold pending MD visit [] Discharge    Electronically signed by: Yelena Bustillo OTR/L T  OT [id number]        Note: If patient does not return for scheduled/ recommended follow up visits, this note will serve as a discharge from care along with most recent update on progress.

## 2020-08-25 ENCOUNTER — HOSPITAL ENCOUNTER (OUTPATIENT)
Dept: OCCUPATIONAL THERAPY | Age: 70
Setting detail: THERAPIES SERIES
Discharge: HOME | End: 2020-08-25
Payer: MEDICARE

## 2020-08-25 PROCEDURE — 97140 MANUAL THERAPY 1/> REGIONS: CPT | Performed by: OCCUPATIONAL THERAPIST

## 2020-08-25 PROCEDURE — 97530 THERAPEUTIC ACTIVITIES: CPT | Performed by: OCCUPATIONAL THERAPIST

## 2020-08-25 PROCEDURE — 97110 THERAPEUTIC EXERCISES: CPT | Performed by: OCCUPATIONAL THERAPIST

## 2020-08-25 NOTE — FLOWSHEET NOTE
[facility], [employer]  [facility] [place] [address]  Phone [phone]      Fax [phone]      Occupational Therapy Treatment Note/ Progress Report:     Is this a Progress Report:     []  Yes  [x]  No      If Yes:  Date Range for reporting period:  Beginning 20  Ending  Progress report will be due (10 Rx or 30 days whichever is less): 60    Recertification will be due (POC Duration  / 90 days whichever is less): 10/5/20  Date:  2020  Patient Name:  Kamila Nelson    :  1950  MRN: [id number]  Medical/Treatment Diagnosis Information:  · Diagnosis: L thumb bennets fracture s/p ORIF   · L hand stiffness M26.642  ·       Insurance/Certification information:  OT Insurance Information: Aetna Medicare $40 copay 100% Med nec  Physician Information:  Referring Practitioner: Dr. Catherine Escalera  Has the plan of care been signed (Y/N):        []  Yes  [x]  No   Comorbidities Affecting Functional Performance:     [x]Anxiety (F41.9)/Depression (F32.9)   []Diabetes Type 1(E10.65) or 2 (E11.65)   []Rheumatoid Arthritis (M05.9)  []Fibromyalgia (M79.7)  []Neuropathy(G60.9)  []Osteoarthritis(M19.91)  []None   []Other:    Visit # Insurance Allowable Auth Required   5 [place] []  Yes []  No    From 20  to 2020    Date of Injury: 20  Date of Surgery: 20   Date of Patient follow up with Physician: 20  RESTRICTIONS/PRECAUTIONS: FA based thumb spica     Latex Allergy:  [x]No      []Yes  Pacemaker:  [x] No       [] Yes   Preferred Language for Healthcare:   [x]English       []other:   Functional Scale: 82% (Quick DASH)   Date assessed:  2020     Pain Scale: 2/10  Current:     SUBJECTIVE:  Scar sensitivity is improving. Able to perform self care tasks                OBJECTIVE:   Date:  Hand Dominance:     [x]? Right    []?  Left 2020  3.5 wks 20  4 wks    Objective Measures:   PAIN 2/10 2/10  2/10 with th abd 2/10 0-2/10   Quick DASH 82%                                               Digits tip to DPFC in cm WNLS       Thumb ROM MP 15  IP 14 MP 25  IP 59  30  55 30  60       Thumb opposition  R:  L: 2.5 cm        Thumb Radial/Palmar abd ROM R:  L: 45      L 50   L 50   L52   Wrist ROM Ext/Flex R:  L: 61/74   L: 75/75  L 80/75    Rad/Uln dev ROM R:  L: 15/35     L20/35    Forearm ROM  Sup/pron R:  L:       Elbow ROM Ext/flex/ R:  L:       Should/er Flex  Shoulder Abd  Shoulder IR/ER         Edema in cm circumf. MCPJs R:  L: 18 cm     L 17.7 cm   Edema in cm circumf.   Wrist R:  L:        strength in lbs/ R:  L: deferred     deferred   Pinch Strengthin lbs: lat  R:  L:       Pinch Strength in lbs:  3 point/ R:  L:          MMT:          Observations:  (including splints, bandages, incisions, scars): R wrist in a cast               MODALITIES: 8/5/20 8/11/20 8/13/20 8/18/20 8/20/20 8/25/20   Fluidotherapy (07397)         Estim (16617/23959)         Paraffin (45090)         US (31870)         Iontophoresis (58698)         Hot Pack 8'        Cold Pack                  INTERVENTIONS:         Therapeutic Exercise (08119)         AROM thumb X 10 each direction IP flex   Th opposition to each digit and each jt of ea digit  Th ext   The abd  Th circumduction  10 x 5 sec hold X 10    X 10     X 10       X 10  IP flex   Th opposition to each digit and each jt of ea digit  Th ext   The abd  Th circumduction  10 x 5 sec hold Th opposition to each digit and each jt of ea digit  Th ext   Th abd  10x Th opposition to each digit and each jt of ea digit    Wrist flex & UD with the flex/  5 x 15 sec hold ea   AROM wrist  X 10 each direction RD/UD   Wrist ext with fingers ext &  Wrist flex with fingers flexed  10x  X 10 EACH  RD/UD   Wrist ext with fingers ext &  Wrist flex with fingers flexed  10x  Wrist ext with fingers ext &  Wrist flex with fingers flexed    RD/UD  10x  FA stretches 5x 15 sec hold ea      Yellow digiflex x 20  Scapular squeezes  10 x2 Th flex picking up beads placed on ulnar side of hand at middle crease of SF Th flex picking up beads placed on ulnar side of hand at middle crease of SF        Rope wristciser  5x    Therapeutic Activity (19763)  Tip of thumb touch 1/2\" high  object placed on ulnar side of hand  10 x LARGE pegboard    Manip golf balls  LARGE pegboard    Th abd with foam cube    Th flex/abd with towel crumble - stopped due to pain Th abd with bead    Gripping cones at large end and stacking. 8x3      20 beads 1 at a time, hold all, then manipulate to radial side of hand and release 1 at a time     Towel crumble with thumb flex /abd     120 pennies , hold all, then place in slotted holes            Manual Therapy (57559)  STM    Scar massage  10' STM    Scar mass 10 Hawks   Dorsal FA and hand    Massage to tight web space Hawks   Dorsal FA and hand Hawks   Dorsal FA and hand   (IASTM, Dry Needling, manual mobilization)    k tape th  and dorsal FA  For scar and sensitivity. Neuromuscular Reeducation (45186)                           ADL Training (87367)    Pt having back pain and FA discomfort with typing. Educated regarding usage of towel roll for lumbar area  And under FA . HEP Training/Review See sheet(s) New HEP given with additional exer. Issued HEP as above see media                  Splinting         Lcode:  forearm based thumb spica         Orthotic Mgmt, Subsequent Enc (01725)         Orthotic Mgmt & Training (58521)                  Other:           Therapeutic Exercise & NMR:  [x] (86840) Provided verbal/tactile cueing for activities related to strengthening, flexibility, endurance, ROM  for improvements in scapular, scapulothoracic and UE control with self care, reaching, carrying, lifting, house/yardwork, driving/computer work.     [x] (64730) Provided verbal/tactile cueing for activities (typically 20 minutes face-to-face)    [] EVAL (MOD) 66398 (typically 30 minutes face-to-face)  [] EVAL (HIGH) 22708 (typically 45 minutes face-to-face)  [] OT Re-eval (53601)       [x] Bebeto [phone]) x  1    [] BCHYE(75766)  [] NMR (22854) x      [] Estim (attended) (73731)   [x] Manual ([id number]) x 1     [] US (79124)  [x] TA (39433) x   1   [] Paraffin (65790)  [] ADL  (81774) x     [] Splint/L code:     [] Estim (unattended) (10314)  [] Fluidotherapy (26986)  [] DN 1-2 (65586)   [] DN 3+ (20203)  [] Orthotic Mgmt, Subsequent Enc (45953)  [] Orthotic Mgmt & Training (70373)  [] Other:    ASSESSMENT: Pt felt extrinsic flex and ext stretching was beneficial. AROM cont to improve and pain is gradually decreasing. W     GOALS:  Patient stated goal:   Regain full function of both hands and R wrist  [x]? Progressing: []? Met: []? Not Met: []? Adjusted     Therapist goals for Patient:   Short Term Goals: To be achieved in: 2 weeks  1. Independent in HEP and progression per patient tolerance, in order to prevent re-injury. [x]? Progressing: [x]? Met: []? Not Met: []? Adjusted   2. Patient will have a decrease in pain to facilitate improvement in movement, function, and ADLs as indicated by Functional Deficits. []? Progressing: []? Met: []? Not Met: []? Adjusted     Long Term Goals to be achieved in 8 weeks (through 10/5/20), including patient directed goals to address patient identified performance deficits:  1) Pt to be independent in graded HEP progression with a good level of effort and compliance. [x]? Progressing: []? Met: []? Not Met: []? Adjusted   2) Pt to report a score of </= 25% on the Quick DASH disability questionnaire for increased performance with carrying, moving, and handling objects. []? Progressing: []? Met: [x]? Not Met: []? Adjusted   3) Pt will demonstrate increased ROM to L thumb to touch base of small finger for improved independence with dressing and grooming . [x]?  Progressing: []? Met: []? Not Met: []? Adjusted   4) Pt will demonstrate increased strength to L pinch to 50% of R for improved independence with buttoning . []? Progressing: []? Met: [x]? Not Met: []? Adjusted   5) Pt will have a decrease in pain to 0-1/10 to facilitate work tasks . [x]? Progressing: []? Met: []? Not Met: []? Adjusted         Overall Progression Towards Functional Goals/Treatment Progress Update:  [x] Patient is progressing as expected towards functional goals listed. [x] Progression is slowed due to complexities/impairments listed. - R UE in cast and L in splint  [] Progression has been slowed due to co-morbidities. [] Plan just implemented, too soon to assess goals progression <30 days  [] Goals require adjustment due to lack of progress  [] Patient is not progressing as expected and requires additional follow up with physician  [] All goals are met  [] Other:     Prognosis for POC: [x] Good [] Fair  [] Poor    Patient requires continued skilled intervention: [x] Yes  [] No    Treatment/Activity Tolerance:  [x] Patient able to complete treatment  [] Patient limited by fatigue  [] Patient limited by pain    [] Patient limited by other medical complications  [] Other:                  PLAN: Progress per MD recommendations  [x] Continue per plan of care [] Alter current plan (see comments above)  [] Plan of care initiated [] Hold pending MD visit [] Discharge    Electronically signed by: Kelly Valladares OTR/L CHT  OT [id number]        Note: If patient does not return for scheduled/ recommended follow up visits, this note will serve as a discharge from care along with most recent update on progress.

## 2020-08-27 ENCOUNTER — APPOINTMENT (OUTPATIENT)
Dept: OCCUPATIONAL THERAPY | Age: 70
End: 2020-08-27
Payer: MEDICARE

## 2020-09-02 ENCOUNTER — OFFICE VISIT (OUTPATIENT)
Dept: ORTHOPEDIC SURGERY | Age: 70
End: 2020-09-02
Payer: MEDICARE

## 2020-09-02 PROCEDURE — 99213 OFFICE O/P EST LOW 20 MIN: CPT | Performed by: ORTHOPAEDIC SURGERY

## 2020-09-02 PROCEDURE — L3908 WHO COCK-UP NONMOLDE PRE OTS: HCPCS | Performed by: ORTHOPAEDIC SURGERY

## 2020-09-03 ENCOUNTER — HOSPITAL ENCOUNTER (OUTPATIENT)
Dept: OCCUPATIONAL THERAPY | Age: 70
Setting detail: THERAPIES SERIES
Discharge: HOME | End: 2020-09-03
Payer: MEDICARE

## 2020-09-03 PROCEDURE — 97168 OT RE-EVAL EST PLAN CARE: CPT | Performed by: OCCUPATIONAL THERAPIST

## 2020-09-03 PROCEDURE — 97110 THERAPEUTIC EXERCISES: CPT | Performed by: OCCUPATIONAL THERAPIST

## 2020-09-03 NOTE — PROGRESS NOTES
[address] [place] [address]  [facility] [address] [place] [address]  Phone [phone]      Fax [phone]        Occupational Therapy Re-Certification Plan of Anil Crawford      Dear Referring Practitioner: Dr. Ata Tucker,    We had the pleasure of treating the following patient for occupational therapy services at [address] [place] [address]. A summary of our findings can be found in the updated assessment below. This includes our plan of care. If you have any questions or concerns regarding these findings, please do not hesitate to contact me at the office phone number checked above.   Thank you for the referral.     Physician Signature:________________________________Date:__________________  By signing above (or electronic signature), therapists plan is approved by physician    Date Range Of Visits: 20-9/3/20  Total Visits to Date:5  Overall Response to Treatment:   [x]Patient is responding well to treatment and improvement is noted with regards to goals   []Patient should continue to improve in reasonable time if they continue HEP   []Patient has plateaued and is no longer responding to skilled OT intervention    []Patient is getting worse and would benefit from return to referring MD   []Patient unable to adhere to initial POC   []Other:                Occupational Therapy Treatment Note/ Progress Report:     Is this a Progress Report:     []  Yes  [x]  No      If Yes:  Date Range for reporting period:  Beginning 20  Ending  Progress report will be due (10 Rx or 30 days whichever is less):     Recertification will be due (POC Duration  / 90 days whichever is less): 10/8/20  Date:  9/3/2020  Patient Name:  Syeda Martinez    :  1950  MRN: [id number]  Medical/Treatment Diagnosis Information:  · Diagnosis: L thumb Pelham fracture s/p ORIF        S52.571D (ICD-10-CM) - Other closed intra-articular fracture of distal end of right radius with routine healing,   · L hand stiffness M26.642   R wrist stiffness M 25.631  ·       Insurance/Certification information:  OT Insurance Information: [employer] $40 copay 100% Med nec    Physician Information:  Referring Practitioner: Dr. Ihsan Huber  Has the plan of care been signed (Y/N):        []  Yes  [x]  No   Comorbidities Affecting Functional Performance:     [x]Anxiety (F41.9)/Depression (F32.9)   []Diabetes Type 1(E10.65) or 2 (E11.65)   []Rheumatoid Arthritis (M05.9)  []Fibromyalgia (M79.7)  []Neuropathy(G60.9)  []Osteoarthritis(M19.91)  []None   []Other:    Visit # Insurance Allowable Auth Required   5 [place] []  Yes []  No    From 8/5/20  to 9/3/2020    Date of Injury: 7/21/20   Right wrist distal radius fracture. She also has an underlying scapholunate ligament disruption that I do not know whether is new or old radiographically    L  Guzmán's fracture  Date of Surgery: 7/28/20 Guzmán's fracture that we performed open reduction internal fixation on the left thumb. Date of Patient follow up with Physician: 4 weeks    RESTRICTIONS/PRECAUTIONS: FA based thumb spica   9/2/20 MD note : strengthening exercises with her left thumb and we will also start her in range of motion therapy for her right wrist    Latex Allergy:  [x]No      []Yes  Pacemaker:  [x] No       [] Yes   Preferred Language for Healthcare:   [x]English       []other:   Functional Scale: 82% (Quick DASH)   Date assessed:  8/5/2020     Pain Scale: 2/10  Current:     SUBJECTIVE:  Scar sensitivity is improving. Able to perform self care tasks                OBJECTIVE:   Date:  Hand Dominance:     [x]? Right    []?  Left 8/5/2020 8/13/20 8/18/20 8/20/20  3.5 wks 8/25/20  4 wks  9/3/20   Objective Measures:       Recert to add R wrist   PAIN 2/10 2/10  2/10 with th abd 2/10 0-2/10 L 0-2/10  R 3-4/10   Quick DASH 82%                                                    Digits tip to DPFC in cm WNLS     WNL bilat Thumb ROM MP 15  IP 14 MP 25  IP 59  30  55 30  60     L  /30  /60    R /45  /45   Thumb tip to DPFC  R:  L: 2.5 cm      L 2  R 1   Thumb Radial/Palmar abd ROM R:  L: 45      L 50   L 50   L52 L 55  R 60   Wrist ROM Ext/Flex R:  L: 61/74   L: 75/75  L 80/75  L 80/80  R 65/45   Rad/Uln dev ROM R:  L: 15/35     L20/35  L 15/30   R 15/25     Forearm ROM  Sup/pron R:  L:     L 80/80  R 70/80     Elbow ROM Ext/flex/ R:  L:        Should/er Flex  Shoulder Abd  Shoulder IR/ER          Edema in cm circumf. MCPJs R:  L: 18 cm     L 17.7 cm    Edema in cm circumf.   Wrist R:  L:         strength in lbs/ R:  L: deferred     deferred L 35#  R deferred   Pinch Strength lbs: lat  R:  L:     L 8#   Pinch Strength in lbs:  3 point/ R:  L:        L 4#   pain   MMT:           Observations:  (including splints, bandages, incisions, scars): R wrist in a cast      R cast removed 9/2/20          MODALITIES: 8/5/20 8/11/20 8/13/20 8/18/20 8/20/20 8/25/20 9/5/20   Fluidotherapy (27964)          Estim (08597/38931)          Paraffin (53772)          US (88640)          Iontophoresis (09285)          Hot Pack 8'         Cold Pack                    INTERVENTIONS:          Therapeutic Exercise (05728)          AROM thumb X 10 each direction IP flex   Th opposition to each digit and each jt of ea digit  Th ext   The abd  Th circumduction  10 x 5 sec hold X 10    X 10     X 10       X 10  IP flex   Th opposition to each digit and each jt of ea digit  Th ext   The abd  Th circumduction  10 x 5 sec hold Th opposition to each digit and each jt of ea digit  Th ext   Th abd  10x Th opposition to each digit and each jt of ea digit    Wrist flex & UD with the flex/  5 x 15 sec hold ea AAROM   R Wrist ext /flex  RD/UD  Sup/pron   10 x2        L Wrist flex & UD with the flex/  5 x 15 sec hold ea   AROM wrist  X 10 each direction RD/UD   Wrist ext with fingers ext &  Wrist flex with fingers flexed  10x  X 10 EACH  RD/UD   Wrist ext with fingers ext &  Wrist flex with fingers flexed  10x  Wrist ext with fingers ext &  Wrist flex with fingers flexed    RD/UD  10x  FA stretches 5x 15 sec hold ea       Yellow digiflex x 20  Scapular squeezes  10 x2 Th flex picking up beads placed on ulnar side of hand at middle crease of SF Th flex picking up beads placed on ulnar side of hand at middle crease of SF L Th flex picking up beads placed on ulnar side of hand at middle crease of SF        Rope wristciser  5x     Therapeutic Activity (83105)  Tip of thumb touch 1/2\" high  object placed on ulnar side of hand  10 x LARGE pegboard    Manip golf balls  LARGE pegboard    Th abd with foam cube    Th flex/abd with towel crumble - stopped due to pain Th abd with bead    Gripping cones at large end and stacking. 8x3      20 beads 1 at a time, hold all, then manipulate to radial side of hand and release 1 at a time  20 beads 1 at a time, hold all, then manipulate to radial side of hand and release 1 at a time  bilat     Towel crumble with thumb flex /abd     120 pennies , hold all, then place in slotted holes              Manual Therapy (72171)  STM    Scar massage  10' STM    Scar mass 10 Hawks   Dorsal FA and hand    Massage to tight web space Hawks   Dorsal FA and hand Hawks   Dorsal FA and hand DTM  bilaterally   (IASTM, Dry Needling, manual mobilization)    k tape th  and dorsal FA  For scar and sensitivity. Neuromuscular Reeducation (16270)                              ADL Training (61301)    Pt having back pain and FA discomfort with typing. Educated regarding usage of towel roll for lumbar area  And under FA . HEP Training/Review See sheet(s) New HEP given with additional exer.      Added AAROM R wrist and FA    Issued HEP as above see media                    Splinting          Lcode:  forearm based thumb spica          Orthotic Mgmt, Subsequent Enc (03851)          Orthotic Mgmt & function with self care, reaching, carrying, lifting, house/yardwork, driving/computer work    ADL Training:  [] (05361) Provided self-care/home management training related to activities of daily living and compensatory training, and/or use of adaptive equipment      Charges:  Timed Code Treatment Minutes: 50   Total Treatment Minutes: 50   Worker's Comp: Time In/Time Out     [] EVAL (LOW) 57612 (typically 20 minutes face-to-face)    [] EVAL (MOD) 48191 (typically 30 minutes face-to-face)  [] EVAL (HIGH) 01782 (typically 45 minutes face-to-face)  [x] OT Re-eval (08852)       [x] Bebeto [phone]) x  2   [] LUVBF(85053)  [] NMR (11458) x      [] Estim (attended) (58829)   [] Manual ([id number]) x 1     [] US (21917)  [] TA (38342) x   1   [] Paraffin (27691)  [] ADL  (11 649 24 60) x     [] Splint/L code:     [] Estim (unattended) [phone])  [] Fluidotherapy (35486)  [] DN 1-2 (21845)   [] DN 3+ (09125)  [] Orthotic Mgmt, Subsequent Enc (99020)  [] Orthotic Mgmt & Training (34430)  [] Other:    ASSESSMENT: Added AAROM to R wrist and FA which pt tolerated well. GOALS:  Patient stated goal:   Regain full function of both hands and R wrist  [x]? Progressing: []? Met: []? Not Met: []? Adjusted     Therapist goals for Patient:   Short Term Goals: To be achieved in: 2 weeks  1. Independent in HEP and progression per patient tolerance, in order to prevent re-injury. [x]? Progressing: [x]? Met: []? Not Met: []? Adjusted   2. Patient will have a decrease in pain to facilitate improvement in movement, function, and ADLs as indicated by Functional Deficits. []? Progressing: []? Met: []? Not Met: []? Adjusted     Long Term Goals to be achieved in 5 weeks (through 10/8/20), including patient directed goals to address patient identified performance deficits:  1) Pt to be independent in graded HEP progression with a good level of effort and compliance. [x]? Progressing: []? Met: []? Not Met: []?  Adjusted   2) Pt to report a score of </= 25% on the Quick DASH disability questionnaire for increased performance with carrying, moving, and handling objects. []? Progressing: []? Met: [x]? Not Met: []? Adjusted   3) Pt will demonstrate increased ROM to L thumb to touch base of small finger for improved and R wrist ext/flex 60/60 to increase independence with dressing and grooming . [x]? Progressing: []? Met: []? Not Met: [x]? Adjusted   4) Pt will demonstrate increased strength to L pinch to 50% of R for improved and R wrist strength 5-/5 independence with buttoning and lifting and work tasks . []? Progressing: []? Met: [x]? Not Met: []? Adjusted   5) Pt will have a decrease in pain to 0-1/10 to facilitate work tasks . [x]? Progressing: []? Met: []? Not Met: []? Adjusted         Overall Progression Towards Functional Goals/Treatment Progress Update:  [x] Patient is progressing as expected towards functional goals listed. [x] Progression is slowed due to complexities/impairments listed. - Rue cast just removed  [] Progression has been slowed due to co-morbidities. [] Plan just implemented, too soon to assess goals progression <30 days  [] Goals require adjustment due to lack of progress  [] Patient is not progressing as expected and requires additional follow up with physician  [] All goals are met  [] Other:     Prognosis for POC: [x] Good [] Fair  [] Poor    Patient requires continued skilled intervention: [x] Yes  [] No    Treatment/Activity Tolerance:  [x] Patient able to complete treatment  [] Patient limited by fatigue  [] Patient limited by pain    [] Patient limited by other medical complications  [] Other:                  PLAN: Progress ROM , strength and function while controlling pain per MD recommendations  [x] Continue per adjusted  plan of care [] Alter current plan (see comments above)  [] Plan of care initiated [] Hold pending MD visit [] Discharge    Electronically signed by:   Marixa Childers OTMICHAEL/ANUP MAXWELL  OT [id number]        Note: If patient does not return for scheduled/ recommended follow up visits, this note will serve as a discharge from care along with most recent update on progress.

## 2020-09-10 ENCOUNTER — TELEPHONE (OUTPATIENT)
Dept: FAMILY MEDICINE CLINIC | Age: 70
End: 2020-09-10

## 2020-09-10 ENCOUNTER — HOSPITAL ENCOUNTER (OUTPATIENT)
Dept: OCCUPATIONAL THERAPY | Age: 70
Setting detail: THERAPIES SERIES
Discharge: HOME | End: 2020-09-10
Payer: MEDICARE

## 2020-09-10 PROCEDURE — 97140 MANUAL THERAPY 1/> REGIONS: CPT | Performed by: OCCUPATIONAL THERAPIST

## 2020-09-10 PROCEDURE — 97110 THERAPEUTIC EXERCISES: CPT | Performed by: OCCUPATIONAL THERAPIST

## 2020-09-10 NOTE — FLOWSHEET NOTE
Scale: 2/10  Current:     SUBJECTIVE:  Scar sensitivity is improving. Able to perform self care tasks                OBJECTIVE:   Date:  Hand Dominance:     [x]? Right    []? Left 8/5/2020 8/13/20 8/18/20 8/20/20  3.5 wks 8/25/20  4 wks  9/3/20 9/10/20   Objective Measures:       Recert to add R wrist    PAIN 2/10 2/10  2/10 with th abd 2/10 0-2/10 L 0-2/10  R 3-4/10 L  1-2/10  R  Wrist 3-4/10   Quick DASH 82%                                                         Digits tip to DPFC in cm WNLS     WNL bilat    Thumb ROM MP 15  IP 14 MP 25  IP 59  30  55 30  60     L  /30  /60    R /45  /45          Thumb tip to DPFC  R:  L: 2.5 cm      L 2  R 1    Thumb Radial/Palmar abd ROM R:  L: 45      L 50   L 50   L52 L 55  R 60    Wrist ROM Ext/Flex R:  L: 61/74   L: 75/75  L 80/75  L 80/80  R 65/45   R 65/45   Rad/Uln dev ROM R:  L: 15/35     L20/35  L 15/30   R 15/25      Forearm ROM  Sup/pron R:  L:     L 80/80  R 70/80      Elbow ROM Ext/flex/ R:  L:         Should/er Flex  Shoulder Abd  Shoulder IR/ER           Edema in cm circumf. MCPJs R:  L: 18 cm     L 17.7 cm     Edema in cm circumf.   Wrist R:  L:          strength in lbs/ R:  L: deferred     deferred L 35#  R deferred    Pinch Strengthin lbs: lat  R:  L:     L 8#    Pinch Strength in lbs:  3 point/ R:  L:        L 4#   pain    MMT:            Observations:  (including splints, bandages, incisions, scars): R wrist in a cast      No cast on R           MODALITIES: 8/5/20  8/11/20 8/13/20  8/18/20 8/20/20 8/25/20 9/3/20 9/10/20   Fluidotherapy (25986)           Estim (29008/51072)           Paraffin (24269)           US (25918)           Iontophoresis (67458)           Hot Pack 8'          Cold Pack                      INTERVENTIONS:           Therapeutic Exercise (73451)           AROM thumb X 10 each direction IP flex   Th opposition to each digit and each jt of ea digit  Th ext   The abd  Th circumduction  10 x 5 sec hold X 10    X 10     X 10       X 10  IP flex   Th opposition to each digit and each jt of ea digit  Th ext   The abd  Th circumduction  10 x 5 sec hold Th opposition to each digit and each jt of ea digit  Th ext   Th abd  10x Th opposition to each digit and each jt of ea digit    Wrist flex & UD with the flex/  5 x 15 sec hold ea AAROM   R Wrist ext /flex  RD/UD  Sup/pron   10 x2        L Wrist flex & UD with th flex/  5 x 15 sec hold ea AAROM   R Wrist ext /flex  RD/UD  Sup/pron   10 x2       RDowel   Sup/pron  RD/UD  10 x    L Wrist flex & UD with th flex/  5 x 15 sec hold ea   AROM wrist  X 10 each direction RD/UD   Wrist ext with fingers ext &  Wrist flex with fingers flexed  10x  X 10 EACH  RD/UD   Wrist ext with fingers ext &  Wrist flex with fingers flexed  10x  Wrist ext with fingers ext &  Wrist flex with fingers flexed    RD/UD  10x  FA stretches 5x 15 sec hold ea  wristciser      Yellow digiflex x 20  Scapular squeezes  10 x2 Th flex picking up beads placed on ulnar side of hand at middle crease of SF Th flex picking up beads placed on ulnar side of hand at middle crease of SF L Th flex picking up beads placed on ulnar side of hand at middle crease of SF L th flex 9 holepegs        Rope wristciser  5x      Therapeutic Activity (63996)  Tip of thumb touch 1/2\" high  object placed on ulnar side of hand  10 x LARGE pegboard    Manip golf balls  LARGE pegboard    Th abd with foam cube    Th flex/abd with towel crumble - stopped due to pain Th abd with bead    Gripping cones at large end and stacking.   8x3      20 beads 1 at a time, hold all, then manipulate to radial side of hand and release 1 at a time  20 beads 1 at a time, hold all, then manipulate to radial side of hand and release 1 at a time  bilat      Towel crumble with thumb flex /abd     120 pennies , hold all, then place in slotted holes  Pink foam    Th flex   Pinch  10 x 2 bilat              Manual Therapy (32669)  [facility]    Scar massage  10' STM    Scar mass 10 Hawks   Dorsal FA and hand    Massage to tight web space Hawks   Dorsal FA and hand Hawks   Dorsal FA and hand DTM Hawks   Dorsal FA and hand   (IASTM, Dry Needling, manual mobilization)    k tape th  and dorsal FA  For scar and sensitivity. Neuromuscular Reeducation (21913)                                 ADL Training (73077)    Pt having back pain and FA discomfort with typing. Educated regarding usage of towel roll for lumbar area  And under FA . HEP Training/Review See sheet(s) New HEP given with additional exer. Added AAROM R wrist and FA     Issued HEP as above see media                      Splinting           Lcode:  forearm based thumb spica           Orthotic Mgmt, Subsequent Enc (44492)           Orthotic Mgmt & Training (74027)                      Other:             Therapeutic Exercise & NMR:  [x] (91193) Provided verbal/tactile cueing for activities related to strengthening, flexibility, endurance, ROM  for improvements in scapular, scapulothoracic and UE control with self care, reaching, carrying, lifting, house/yardwork, driving/computer work. [x] (75900) Provided verbal/tactile cueing for activities related to improving balance, coordination, kinesthetic sense, posture, motor skill, proprioception  to assist with  scapular, scapulothoracic and UE control with self care, reaching, carrying, lifting, house/yardwork, driving/computer work.     Therapeutic Activities & NMR:    [x] (37642 or 28694) Provided verbal/tactile cueing for activities related to improving balance, coordination, kinesthetic sense, posture, motor skill, proprioception and motor activation to allow for proper function of scapular, scapulothoracic and UE control with self care, carrying, lifting, driving/computer work    Home Exercise Program:    [x] (65443) Reviewed/Progressed HEP activities related to strengthening, flexibility, endurance, ROM of scapular, scapulothoracic and UE control with self care, reaching, carrying, lifting, house/yardwork, driving/computer work  [] (35516) Reviewed/Progressed HEP activities related to improving balance, coordination, kinesthetic sense, posture, motor skill, proprioception of scapular, scapulothoracic and UE control with self care, reaching, carrying, lifting, house/yardwork, driving/computer work      Manual Treatments:  PROM / STM / Oscillations-Mobs:  G-I, II, III, IV (PA's, Inf., Post.)  [x] (25954) Provided manual therapy to mobilize soft tissue/joints of cervical/CT, scapular GHJ and UE for the purpose of modulating pain, promoting relaxation,  increasing ROM, reducing/eliminating soft tissue swelling/inflammation/restriction, improving soft tissue extensibility and allowing for proper ROM for normal function with self care, reaching, carrying, lifting, house/yardwork, driving/computer work    ADL Training:  [] (76372) Provided self-care/home management training related to activities of daily living and compensatory training, and/or use of adaptive equipment      Charges:  Timed Code Treatment Minutes: 46   Total Treatment Minutes: 46   Worker's Comp: Time In/Time Out     [] EVAL (LOW) 24717 (typically 20 minutes face-to-face)    [] EVAL (MOD) 99184 (typically 30 minutes face-to-face)  [] EVAL (HIGH) 53582 (typically 45 minutes face-to-face)  [] OT Re-eval (49224)       [x] Bebeto [phone]) x  2   [] RWDJI(47678)  [] NMR (39790) x      [] Estim (attended) (26970)   [x] Manual ([id number]) x 1     [] US (38493)  [] TA (11531) x   1   [] Paraffin (16481)  [] ADL  (25 053 38 60) x     [] Splint/L code:     [] Estim (unattended) (22 [phone])  [] Fluidotherapy (55874)  [] DN 1-2 (94483)   [] DN 3+ (65965)  [] Orthotic Mgmt, Subsequent Enc (23632)  [] Orthotic Mgmt & Training (51851)  [] Other:    ASSESSMENT: Increased R wrist pain with discont of cast       GOALS:  Patient stated goal:   Regain full function of both hands and R wrist  [x]? ? Progressing: []?? Met: []?? Not Met: []?? Adjusted     Therapist goals for Patient:   Short Term Goals: To be achieved in: 2 weeks  1. Independent in HEP and progression per patient tolerance, in order to prevent re-injury.     [x]? ? Progressing: [x]? ? Met: []?? Not Met: []?? Adjusted   2. Patient will have a decrease in pain to facilitate improvement in movement, function, and ADLs as indicated by Functional Deficits.    []? ? Progressing: []?? Met: []?? Not Met: []?? Adjusted     Long Term Goals to be achieved in 5 weeks (through 10/8/20), including patient directed goals to address patient identified performance deficits:  1) Pt to be independent in graded HEP progression with a good level of effort and compliance. [x]? ? Progressing: []?? Met: []?? Not Met: []?? Adjusted   2) Pt to report a score of </= 25% on the Quick DASH disability questionnaire for increased performance with carrying, moving, and handling objects. []?? Progressing: []?? Met: [x]? ? Not Met: []?? Adjusted   3) Pt will demonstrate increased ROM to L thumb to touch base of small finger for improved and R wrist ext/flex 60/60 to increase independence with dressing and grooming .  [x]? ? Progressing: []?? Met: []?? Not Met: [x]? ? Adjusted   4) Pt will demonstrate increased strength to L pinch to 50% of R for improved and R wrist strength 5-/5 independence with buttoning and lifting and work tasks .  []?? Progressing: []?? Met: [x]? ? Not Met: []?? Adjusted   5) Pt will have a decrease in pain to 0-1/10 to facilitate work tasks .  [x]? ? Progressing: []?? Met: []?? Not Met: []?? Adjusted       Overall Progression Towards Functional Goals/Treatment Progress Update:  [x] Patient is progressing as expected towards functional goals listed. [] Progression is slowed due to complexities/impairments listed. -   [] Progression has been slowed due to co-morbidities.   [] Plan just implemented, too soon to assess goals progression <30 days  [] Goals require adjustment due to lack of progress  [] Patient is not progressing as expected and requires additional follow up with physician  [] All goals are met  [] Other:     Prognosis for POC: [x] Good [] Fair  [] Poor    Patient requires continued skilled intervention: [x] Yes  [] No    Treatment/Activity Tolerance:  [x] Patient able to complete treatment  [] Patient limited by fatigue  [] Patient limited by pain    [] Patient limited by other medical complications  [] Other:                  PLAN:  Progress ROM , strength and function while controlling pain per MD recommendations  [x] Continue per plan of care [] Alter current plan (see comments above)  [] Plan of care initiated [] Hold pending MD visit [] Discharge    Electronically signed by: Komal Mason OTR/L CHT  OT [id number]        Note: If patient does not return for scheduled/ recommended follow up visits, this note will serve as a discharge from care along with most recent update on progress.

## 2020-09-10 NOTE — TELEPHONE ENCOUNTER
----- Message from Zane Kirk sent at 9/10/2020  9:11 AM EDT -----  Subject: Message to Provider    QUESTIONS  Information for Provider? Castillo Santiago calling from [facility] about a   respong from Dr. Casandra Kincaid out Sofya Kennedy that was sent 8/12/2020. also   wanted to the the office know that the benefit is covered under her   wellness plan at 100% . Can be reached at [phone]  ---------------------------------------------------------------------------  --------------  CALL BACK INFO  What is the best way for the office to contact you? OK to leave message on   voicemail  Preferred Call Back Phone Number? [phone]  ---------------------------------------------------------------------------  --------------  SCRIPT ANSWERS  Relationship to Patient? Other  Representative Name? christine ([employer] medicare)  Is the Representative on the appropriate HIPAA document in Epic?  Yes

## 2020-09-15 ENCOUNTER — HOSPITAL ENCOUNTER (OUTPATIENT)
Dept: OCCUPATIONAL THERAPY | Age: 70
Setting detail: THERAPIES SERIES
Discharge: HOME | End: 2020-09-15
Payer: MEDICARE

## 2020-09-15 PROCEDURE — 97140 MANUAL THERAPY 1/> REGIONS: CPT | Performed by: OCCUPATIONAL THERAPIST

## 2020-09-15 PROCEDURE — 97110 THERAPEUTIC EXERCISES: CPT | Performed by: OCCUPATIONAL THERAPIST

## 2020-09-15 NOTE — FLOWSHEET NOTE
[facility], [employer]  [address] [place] [address]  Phone [phone]      Fax [phone]      Occupational Therapy Treatment Note/ Progress Report:     Is this a Progress Report:     []  Yes  [x]  No      If Yes:  Date Range for reporting period:  Beginning 20  Ending  Progress report will be due (10 Rx or 30 days whichever is less): 20    Recertification will be due (POC Duration  / 90 days whichever is less): 10/5/20  Date:  9/15/2020  Patient Name:  Nasima Vidal    :  1950  MRN: [id number]  Medical/Treatment Diagnosis Information:  · Diagnosis: L thumb bennets fracture s/p ORIF   · L hand stiffness M26.642  ·       Insurance/Certification information:  OT Insurance Information: A[employer] Medicare $40 copay 100% Med nec  Physician Information:  Referring Practitioner: Dr. Chitra Lee  Has the plan of care been signed (Y/N):        []  Yes  [x]  No   Comorbidities Affecting Functional Performance:     [x]Anxiety (F41.9)/Depression (F32.9)   []Diabetes Type 1(E10.65) or 2 (E11.65)   []Rheumatoid Arthritis (M05.9)  []Fibromyalgia (M79.7)  []Neuropathy(G60.9)  []Osteoarthritis(M19.91)  []None   []Other:    Visit # Insurance Allowable Auth Required   7 MN []  Yes []  No    From 20  to 9/15/2020  Right wrist distal radius fracture.  She also has an underlying scapholunate ligament disruption that I do not know whether is new or old radiographically    L  Guzmán's fracture  Date of Injury: 20  Guzmán's fracture that we performed open reduction internal fixation on the left thumb.   Date of Surgery: 20   Date of Patient follow up with Physician: 4 weeks  RESTRICTIONS/PRECAUTIONS: FA based thumb spica     Latex Allergy:  [x]No      []Yes  Pacemaker:  [x] No       [] Yes   Preferred Language for Healthcare:   [x]English       []other:   Functional Scale: 82% (Quick DASH)   Date assessed:  2020 Pain Scale: 2/10  Current: R 2-3/10  L 1-2/10    SUBJECTIVE:  Still cannot do heavy tasks with L.  T wrist flex is decreased and increased pain from working on mobility      Scar sensitivity is improving. Able to perform self care tasks                OBJECTIVE:   Date:  Hand Dominance:     [x]? Right    []? Left 8/5/2020 8/13/20 8/18/20 8/20/20  3.5 wks 8/25/20  4 wks  9/3/20 9/10/20 9/15/20   Objective Measures:       Recert to add R wrist     PAIN 2/10 2/10  2/10 with th abd 2/10 0-2/10 L 0-2/10  R 3-4/10 L  1-2/10  R  Wrist 3-4/10    Quick DASH 82%                                                              Digits tip to DPFC in cm WNLS     WNL bilat     Thumb ROM MP 15  IP 14 MP 25  IP 59  30  55 30  60     L  /30  /60    R /45  /45        L/30  /60      R/55  /35   Thumb tip to DPFC  R:  L: 2.5 cm      L 2  R 1  same   Thumb Radial/Palmar abd ROM R:  L: 45      L 50   L 50   L52 L 55  R 60     Wrist ROM Ext/Flex R:  L: 61/74   L: 75/75  L 80/75  L 80/80  R 65/45   R 65/45   R65/60   Rad/Uln dev ROM R:  L: 15/35     L20/35  L 15/30   R 15/25       Forearm ROM  Sup/pron R:  L:     L 80/80  R 70/80      R 75/80   Elbow ROM Ext/flex/ R:  L:          Should/er Flex  Shoulder Abd  Shoulder IR/ER            Edema in cm circumf. MCPJs R:  L: 18 cm     L 17.7 cm      Edema in cm circumf.   Wrist R:  L:           strength in lbs/ R:  L: deferred     deferred L 35#  R deferred  L 45#  R 21#   Pinch Strengthin lbs: lat  R:  L:     L 8#  L 10#    R 12#   Pinch Strength in lbs:  3 point/ R:  L:        L 4#   pain  L 4#  R 8#   MMT:             Observations:  (including splints, bandages, incisions, scars): R wrist in a cast      No cast on R            MODALITIES: 8/5/20  8/20/20 8/25/20 9/3/20 9/10/20 9/15/20   Fluidotherapy (46258)         Estim (06755/05592)         Paraffin (57657)         US (39321)         Iontophoresis (72486)         Hot Pack 8'        Cold Pack                  INTERVENTIONS: Therapeutic Exercise (96738)         AROM thumb X 10 each direction Th opposition to each digit and each jt of ea digit  Th ext   Th abd  10x Th opposition to each digit and each jt of ea digit    Wrist flex & UD with the flex/  5 x 15 sec hold ea AAROM   R Wrist ext /flex  RD/UD  Sup/pron   10 x2        L Wrist flex & UD with th flex/  5 x 15 sec hold ea AAROM   R Wrist ext /flex  RD/UD  Sup/pron   10 x2       R Dowel   Sup/pron  RD/UD  10 x    L Wrist flex & UD with th flex/  5 x 15 sec hold ea AAROM   R Wrist ext /flex  RD/UD  Sup/pron   10 x2    thumbciser  1 lg band  15 x2 bilat   AROM wrist  X 10 each direction Wrist ext with fingers ext &  Wrist flex with fingers flexed    RD/UD  10x  FA stretches 5x 15 sec hold ea  wristciser wristciser  vert 25x        1#  Wrist ext  Wrist flex  RD/UD  Sup/pron  10x2     Th flex picking up beads placed on ulnar side of hand at middle crease of SF Th flex picking up beads placed on ulnar side of hand at middle crease of SF L Th flex picking up beads placed on ulnar side of hand at middle crease of SF L th flex 9 holepegs      Rope wristciser  5x       Therapeutic Activity (72521)  Th abd with bead    Gripping cones at large end and stacking.   8x3      20 beads 1 at a time, hold all, then manipulate to radial side of hand and release 1 at a time  20 beads 1 at a time, hold all, then manipulate to radial side of hand and release 1 at a time  bilat  velcro checkers under edge of table for wrist flex  4x3 sets       120 pennies , hold all, then place in slotted holes  Pink foam    Th flex   Pinch  10 x 2 bilat             Manual Therapy (65922)  Hawks   Dorsal FA and hand Hawks   Dorsal FA and hand DTM Hawks   Dorsal FA and hand R hawks  to FA   (IASTM, Dry Needling, manual mobilization)      Edema glove    L gel pad for L th scar due to being raised            Neuromuscular Reeducation (99551)                           ADL Training (69808)                           HEP Training/Review See sheet(s)   Added AAROM R wrist and FA  Added wrist strengthening to HEP    Issued HEP as above see media                  Splinting         Lcode:  forearm based thumb spica         Orthotic Mgmt, Subsequent Enc (67874)         Orthotic Mgmt & Training (37917)                  Other:           Therapeutic Exercise & NMR:  [x] (03273) Provided verbal/tactile cueing for activities related to strengthening, flexibility, endurance, ROM  for improvements in scapular, scapulothoracic and UE control with self care, reaching, carrying, lifting, house/yardwork, driving/computer work. [x] (03373) Provided verbal/tactile cueing for activities related to improving balance, coordination, kinesthetic sense, posture, motor skill, proprioception  to assist with  scapular, scapulothoracic and UE control with self care, reaching, carrying, lifting, house/yardwork, driving/computer work.     Therapeutic Activities & NMR:    [x] (07821 or 71361) Provided verbal/tactile cueing for activities related to improving balance, coordination, kinesthetic sense, posture, motor skill, proprioception and motor activation to allow for proper function of scapular, scapulothoracic and UE control with self care, carrying, lifting, driving/computer work    Home Exercise Program:    [x] (83131) Reviewed/Progressed HEP activities related to strengthening, flexibility, endurance, ROM of scapular, scapulothoracic and UE control with self care, reaching, carrying, lifting, house/yardwork, driving/computer work  [] (38426) Reviewed/Progressed HEP activities related to improving balance, coordination, kinesthetic sense, posture, motor skill, proprioception of scapular, scapulothoracic and UE control with self care, reaching, carrying, lifting, house/yardwork, driving/computer work      Manual Treatments:  PROM / STM / Oscillations-Mobs:  G-I, II, III, IV (PA's, Inf., Post.)  [x] (04351) Provided manual therapy to mobilize soft tissue/joints of cervical/CT, scapular GHJ and UE for the purpose of modulating pain, promoting relaxation,  increasing ROM, reducing/eliminating soft tissue swelling/inflammation/restriction, improving soft tissue extensibility and allowing for proper ROM for normal function with self care, reaching, carrying, lifting, house/yardwork, driving/computer work    ADL Training:  [] (25668) Provided self-care/home management training related to activities of daily living and compensatory training, and/or use of adaptive equipment      Charges:  Timed Code Treatment Minutes: 45   Total Treatment Minutes: 45   Worker's Comp: Time In/Time Out     [] EVAL (LOW) 83386 (typically 20 minutes face-to-face)    [] EVAL (MOD) 73786 (typically 30 minutes face-to-face)  [] EVAL (HIGH) 66346 (typically 45 minutes face-to-face)  [] OT Re-eval (91003)       [x] Bebeto [phone]) x  2   [] SMRUR(63590)  [] NMR (89729) x      [] Estim (attended) (04869)   [x] Manual (62020 [facility]) x 1     [] US (14998)  [] TA (97784) x   1   [] Paraffin (70542)  [] ADL  (19094) x     [] Splint/L code:     [] Estim (unattended) 33 [phone])  [] Fluidotherapy (65571)  [] DN 1-2 (84272)   [] DN 3+ (68722)  [] Orthotic Mgmt, Subsequent Enc (93890)  [] Orthotic Mgmt & Training (06493)  [] Other:    ASSESSMENT:  Tolerated light strengthening well       GOALS:  Patient stated goal:   Regain full function of both hands and R wrist  [x]? ? Progressing: []?? Met: []?? Not Met: []?? Adjusted     Therapist goals for Patient:   Short Term Goals: To be achieved in: 2 weeks  1. Independent in HEP and progression per patient tolerance, in order to prevent re-injury.     [x]? ? Progressing: [x]? ? Met: []?? Not Met: []?? Adjusted   2. Patient will have a decrease in pain to facilitate improvement in movement, function, and ADLs as indicated by Functional Deficits.    []? ? Progressing: []?? Met: []?? Not Met: []?? Adjusted     Long Term Goals to be achieved in 5 weeks (through 10/8/20), including patient directed goals to address patient identified performance deficits:  1) Pt to be independent in graded HEP progression with a good level of effort and compliance. [x]? ? Progressing: []?? Met: []?? Not Met: []?? Adjusted   2) Pt to report a score of </= 25% on the Quick DASH disability questionnaire for increased performance with carrying, moving, and handling objects. []?? Progressing: []?? Met: [x]? ? Not Met: []?? Adjusted   3) Pt will demonstrate increased ROM to L thumb to touch base of small finger for improved and R wrist ext/flex 60/60 to increase independence with dressing and grooming .  [x]? ? Progressing: []?? Met: []?? Not Met: [x]? ? Adjusted   4) Pt will demonstrate increased strength to L pinch to 50% of R for improved and R wrist strength 5-/5 independence with buttoning and lifting and work tasks .  []?? Progressing: []?? Met: [x]? ? Not Met: []?? Adjusted   5) Pt will have a decrease in pain to 0-1/10 to facilitate work tasks .  [x]? ? Progressing: []?? Met: []?? Not Met: []?? Adjusted       Overall Progression Towards Functional Goals/Treatment Progress Update:  [x] Patient is progressing as expected towards functional goals listed. [] Progression is slowed due to complexities/impairments listed. -   [] Progression has been slowed due to co-morbidities.   [] Plan just implemented, too soon to assess goals progression <30 days  [] Goals require adjustment due to lack of progress  [] Patient is not progressing as expected and requires additional follow up with physician  [] All goals are met  [] Other:     Prognosis for POC: [x] Good [] Fair  [] Poor    Patient requires continued skilled intervention: [x] Yes  [] No    Treatment/Activity Tolerance:  [x] Patient able to complete treatment  [] Patient limited by fatigue  [] Patient limited by pain    [] Patient limited by other medical complications  [] Other:                  PLAN:  Progress ROM , strength and function while controlling pain per MD recommendations  [x] Continue per plan of care [] Alter current plan (see comments above)  [] Plan of care initiated [] Hold pending MD visit [] Discharge    Electronically signed by: Norma Ellison OTR/L CHT  OT [id number]        Note: If patient does not return for scheduled/ recommended follow up visits, this note will serve as a discharge from care along with most recent update on progress.

## 2020-09-17 ENCOUNTER — HOSPITAL ENCOUNTER (OUTPATIENT)
Dept: OCCUPATIONAL THERAPY | Age: 70
Setting detail: THERAPIES SERIES
Discharge: HOME | End: 2020-09-17
Payer: MEDICARE

## 2020-09-17 PROCEDURE — 97110 THERAPEUTIC EXERCISES: CPT | Performed by: OCCUPATIONAL THERAPIST

## 2020-09-17 PROCEDURE — 97530 THERAPEUTIC ACTIVITIES: CPT | Performed by: OCCUPATIONAL THERAPIST

## 2020-09-17 NOTE — FLOWSHEET NOTE
[facility], [facility] [place] [address]  Phone [phone]      Fax [phone]      Occupational Therapy Treatment Note/ Progress Report:     Is this a Progress Report:     []  Yes  [x]  No      If Yes:  Date Range for reporting period:  Beginning 20  Ending  Progress report will be due (10 Rx or 30 days whichever is less):  97/3/80    Recertification will be due (POC Duration  / 90 days whichever is less): 10/8/20  Date:  2020  Patient Name:  Romayne Beverage    :  1950  MRN: [id number]  Medical/Treatment Diagnosis Information:  · Diagnosis: L thumb bennets fracture s/p ORIF    S52.571D (ICD-10-CM) - Other closed intra-articular fracture   · L hand stiffness M26.642  R wrist stiffness M25.631  ·       Insurance/Certification information:  OT Insurance Information: Aetna Medicare $40 copay 100% Med nec  Physician Information:  Referring Practitioner: Dr. Leiva Me  Has the plan of care been signed (Y/N):        []  Yes  [x]  No   Comorbidities Affecting Functional Performance:     [x]Anxiety (F41.9)/Depression (F32.9)   []Diabetes Type 1(E10.65) or 2 (E11.65)   []Rheumatoid Arthritis (M05.9)  []Fibromyalgia (M79.7)  []Neuropathy(G60.9)  []Osteoarthritis(M19.91)  []None   []Other:    Visit # Insurance Allowable Auth Required   8 MN []  Yes []  No    From 20  to 2020  Right wrist distal radius fracture.  She also has an underlying scapholunate ligament disruption that I do not know whether is new or old radiographically    L  Guzmán's fracture  Date of Injury: 20  Guzmán's fracture that we performed open reduction internal fixation on the left thumb.   Date of Surgery: 20   Date of Patient follow up with Physician: 4 weeks  RESTRICTIONS/PRECAUTIONS: FA based thumb spica     Latex Allergy:  [x]No      []Yes  Pacemaker:  [x] No       [] Yes   Preferred Language for Healthcare: [x]English       []other:   Functional Scale:   16 % ( initial )82% (Quick DASH)   Date assessed:  9/17/2020     Pain Scale: 2/10  Current: R 1/10  L 0/10    SUBJECTIVE: Pain increased following last session to 3-4/10 in R     Still cannot do heavy tasks with L.  T wrist flex is decreased and increased pain from working on mobility      Scar sensitivity is improving. Able to perform self care tasks                OBJECTIVE:   Date:  Hand Dominance:     [x]? Right    []? Left 8/5/2020 8/13/20 8/18/20 8/20/20  3.5 wks 8/25/20  4 wks  9/3/20 9/10/20 9/15/20 9/17/20   Objective Measures:       Recert to add R wrist      PAIN 2/10 2/10  2/10 with th abd 2/10 0-2/10 L 0-2/10  R 3-4/10 L  1-2/10  R  Wrist 3-4/10     Quick DASH 82%                                                                   Digits tip to DPFC in cm WNLS     WNL bilat      Thumb ROM MP 15  IP 14 MP 25  IP 59  30  55 30  60     L  /30  /60    R /45  /45        L/30  /60      R/55  /35    Thumb tip to DPFC  R:  L: 2.5 cm      L 2  R 1  same    Thumb Radial/Palmar abd ROM R:  L: 45      L 50   L 50   L52 L 55  R 60      Wrist ROM Ext/Flex R:  L: 61/74   L: 75/75  L 80/75  L 80/80  R 65/45   R 65/45   R65/60    Rad/Uln dev ROM R:  L: 15/35     L20/35  L 15/30   R 15/25        Forearm ROM  Sup/pron R:  L:     L 80/80  R 70/80      R 75/80    Elbow ROM Ext/flex/ R:  L:           Should/er Flex  Shoulder Abd  Shoulder IR/ER             Edema in cm circumf. MCPJs R:  L: 18 cm     L 17.7 cm       Edema in cm circumf.   Wrist R:  L:            strength in lbs/ R:  L: deferred     deferred L 35#  R deferred  L 45#  R 21#    Pinch Strengthin lbs: lat  R:  L:     L 8#  L 10#    R 12#    Pinch Strength in lbs:  3 point/ R:  L:        L 4#   pain  L 4#  R 8#    MMT:              Observations:  (including splints, bandages, incisions, scars): R wrist in a cast      No cast on R             MODALITIES: 8/5/20  8/20/20 8/25/20 9/3/20 9/10/20 9/15/20 9/17/20 slotted holes  Pink foam    Th flex   Pinch  10 x 2 bilat  Th flex with beads on ulnar side of hand                      Manual Therapy (74805)  Hawks   Dorsal FA and hand Hawks   Dorsal FA and hand DTM Hawks   Dorsal FA and hand R hawks  to FA     (IASTM, Dry Needling, manual mobilization)      Edema glove    L gel pad for L th scar due to being raised                Neuromuscular Reeducation (11357)                                 ADL Training (03676)                                 HEP Training/Review See sheet(s)   Added AAROM R wrist and FA  Added wrist strengthening to HEP      Issued HEP as above see media                      Splinting           Lcode:  forearm based thumb spica           Orthotic Mgmt, Subsequent Enc (88294)           Orthotic Mgmt & Training (90845)                      Other:             Therapeutic Exercise & NMR:  [x] (77803) Provided verbal/tactile cueing for activities related to strengthening, flexibility, endurance, ROM  for improvements in scapular, scapulothoracic and UE control with self care, reaching, carrying, lifting, house/yardwork, driving/computer work. [x] (91707) Provided verbal/tactile cueing for activities related to improving balance, coordination, kinesthetic sense, posture, motor skill, proprioception  to assist with  scapular, scapulothoracic and UE control with self care, reaching, carrying, lifting, house/yardwork, driving/computer work.     Therapeutic Activities & NMR:    [x] (74517 or 91929) Provided verbal/tactile cueing for activities related to improving balance, coordination, kinesthetic sense, posture, motor skill, proprioception and motor activation to allow for proper function of scapular, scapulothoracic and UE control with self care, carrying, lifting, driving/computer work    Home Exercise Program:    [x] (10300) Reviewed/Progressed HEP activities related to strengthening, flexibility, endurance, ROM of scapular, scapulothoracic and UE control with self care, reaching, carrying, lifting, house/yardwork, driving/computer work  [] (91688) Reviewed/Progressed HEP activities related to improving balance, coordination, kinesthetic sense, posture, motor skill, proprioception of scapular, scapulothoracic and UE control with self care, reaching, carrying, lifting, house/yardwork, driving/computer work      Manual Treatments:  PROM / STM / Oscillations-Mobs:  G-I, II, III, IV (PA's, Inf., Post.)  [x] (58772) Provided manual therapy to mobilize soft tissue/joints of cervical/CT, scapular GHJ and UE for the purpose of modulating pain, promoting relaxation,  increasing ROM, reducing/eliminating soft tissue swelling/inflammation/restriction, improving soft tissue extensibility and allowing for proper ROM for normal function with self care, reaching, carrying, lifting, house/yardwork, driving/computer work    ADL Training:  [] (30611) Provided self-care/home management training related to activities of daily living and compensatory training, and/or use of adaptive equipment      Charges:  Timed Code Treatment Minutes: 44   Total Treatment Minutes: 44   Worker's Comp: Time In/Time Out     [] EVAL (LOW) 62065 (typically 20 minutes face-to-face)    [] EVAL (MOD) 61130 (typically 30 minutes face-to-face)  [] EVAL (HIGH) [phone] (typically 45 minutes face-to-face)  [] OT Re-eval (58001)       [x] Bebeto [phone]) x  2   [] WHBBZ(09866)  [] NMR (30096) x      [] Estim (attended) (35986)   [] Manual ([phone]) x      [] US (31438)  [x] TA () x   1   [] Paraffin (13408)  [] ADL  [phone]) x     [] Splint/L code:     [] Estim (unattended) 33 93 31)  [] Fluidotherapy (08696)  [] DN 1-2 (30394)   [] DN 3+ ([phone])  [] Orthotic Mgmt, Subsequent Enc (38467)  [] Orthotic Mgmt & Training (06267)  [] Other:    ASSESSMENT:  Tolerated light strengthening well       GOALS:  Patient stated goal:   Regain full function of both hands and R wrist  [x]? ? Progressing: []?? Met: []?? Not Met: []?? Adjusted     Therapist goals for Patient:   Short Term Goals: To be achieved in: 2 weeks  1. Independent in HEP and progression per patient tolerance, in order to prevent re-injury.     [x]? ? Progressing: [x]? ? Met: []?? Not Met: []?? Adjusted   2. Patient will have a decrease in pain to facilitate improvement in movement, function, and ADLs as indicated by Functional Deficits.    []? ? Progressing: []?? Met: []?? Not Met: []?? Adjusted     Long Term Goals to be achieved in 5 weeks (through 10/8/20), including patient directed goals to address patient identified performance deficits:  1) Pt to be independent in graded HEP progression with a good level of effort and compliance. [x]? ? Progressing: []?? Met: []?? Not Met: []?? Adjusted   2) Pt to report a score of </= 25% on the Quick DASH disability questionnaire for increased performance with carrying, moving, and handling objects. []?? Progressing: []?? Met: [x]? ? Not Met: []?? Adjusted   3) Pt will demonstrate increased ROM to L thumb to touch base of small finger for improved and R wrist ext/flex 60/60 to increase independence with dressing and grooming .  [x]? ? Progressing: []?? Met: []?? Not Met: [x]? ? Adjusted   4) Pt will demonstrate increased strength to L pinch to 50% of R for improved and R wrist strength 5-/5 independence with buttoning and lifting and work tasks .  []?? Progressing: []?? Met: [x]? ? Not Met: []?? Adjusted   5) Pt will have a decrease in pain to 0-1/10 to facilitate work tasks .  [x]? ? Progressing: []?? Met: []?? Not Met: []?? Adjusted       Overall Progression Towards Functional Goals/Treatment Progress Update:  [x] Patient is progressing as expected towards functional goals listed. [] Progression is slowed due to complexities/impairments listed. -   [] Progression has been slowed due to co-morbidities.   [] Plan just implemented, too soon to assess goals progression <30 days  [] Goals require adjustment due to lack of progress  [] Patient is not progressing as expected and requires additional follow up with physician  [] All goals are met  [] Other:     Prognosis for POC: [x] Good [] Fair  [] Poor    Patient requires continued skilled intervention: [x] Yes  [] No    Treatment/Activity Tolerance:  [x] Patient able to complete treatment  [] Patient limited by fatigue  [] Patient limited by pain    [] Patient limited by other medical complications  [] Other:                  PLAN:  Progress ROM , strength and function while controlling pain per MD recommendations  [x] Continue per plan of care [] Alter current plan (see comments above)  [] Plan of care initiated [] Hold pending MD visit [] Discharge    Electronically signed by: Becca Aylaa OTR/ANUP T  OT [id number]        Note: If patient does not return for scheduled/ recommended follow up visits, this note will serve as a discharge from care along with most recent update on progress.

## 2020-09-22 ENCOUNTER — HOSPITAL ENCOUNTER (OUTPATIENT)
Dept: OCCUPATIONAL THERAPY | Age: 70
Setting detail: THERAPIES SERIES
Discharge: HOME | End: 2020-09-22
Payer: MEDICARE

## 2020-09-22 PROCEDURE — 97110 THERAPEUTIC EXERCISES: CPT | Performed by: OCCUPATIONAL THERAPIST

## 2020-09-22 PROCEDURE — 97140 MANUAL THERAPY 1/> REGIONS: CPT | Performed by: OCCUPATIONAL THERAPIST

## 2020-09-22 NOTE — FLOWSHEET NOTE
[facility], [facility] [place] [address]  Phone [phone]      Fax [phone]      Occupational Therapy Treatment Note/ Progress Report:     Is this a Progress Report:     []  Yes  [x]  No      If Yes:  Date Range for reporting period:  Beginning 20  Ending  Progress report will be due (10 Rx or 30 days whichever is less):      Recertification will be due (POC Duration  / 90 days whichever is less): 10/8/20  Date:  2020  Patient Name:  Luann Chi    :  1950  MRN: [id number]  Medical/Treatment Diagnosis Information:  · Diagnosis: L thumb bennets fracture s/p ORIF    S52.571D (ICD-10-CM) - Other closed intra-articular fracture   · L hand stiffness M26.642  R wrist stiffness M25.631  ·       Insurance/Certification information:  OT Insurance Information: [employer] $40 copay 100% Med nec  Physician Information:  Referring Practitioner: Dr. Marcia Shanks  Has the plan of care been signed (Y/N):        []  Yes  [x]  No   Comorbidities Affecting Functional Performance:     [x]Anxiety (F41.9)/Depression (F32.9)   []Diabetes Type 1(E10.65) or 2 (E11.65)   []Rheumatoid Arthritis (M05.9)  []Fibromyalgia (M79.7)  []Neuropathy(G60.9)  []Osteoarthritis(M19.91)  []None   []Other:    Visit # Insurance Allowable Auth Required   9 MN []  Yes []  No    From 20  to 2020  Right wrist distal radius fracture.  She also has an underlying scapholunate ligament disruption that I do not know whether is new or old radiographically    L  Guzmán's fracture  Date of Injury: 20  Guzmán's fracture that we performed open reduction internal fixation on the left thumb.   Date of Surgery: 20   Date of Patient follow up with Physician: 4 weeks  RESTRICTIONS/PRECAUTIONS: FA based thumb spica     Latex Allergy:  [x]No      []Yes  Pacemaker:  [x] No       [] Yes   Preferred Language for Healthcare: [x]English       []other:   Functional Scale:   16 % ( initial )82% (Quick DASH)   Date assessed:  9/17/2020     Pain Scale: 2/10  Current: R 1/10  L 1/10    SUBJECTIVE: Feels she has decreased strength           OBJECTIVE:   Date:  Hand Dominance:     [x]? Right    []? Left 8/5/2020 8/13/20 8/18/20 8/20/20  3.5 wks 8/25/20  4 wks  9/3/20 9/10/20 9/15/20 9/17/20 9/22/20   Objective Measures:       Recert to add R wrist       PAIN 2/10 2/10  2/10 with th abd 2/10 0-2/10 L 0-2/10  R 3-4/10 L  1-2/10  R  Wrist 3-4/10      Quick DASH 82%                                                                        Digits tip to DPFC in cm WNLS     WNL bilat       Thumb ROM MP 15  IP 14 MP 25  IP 59  30  55 30  60     L  /30  /60    R /45  /45        L/30  /60      R/55  /35     Thumb tip to DPFC  R:  L: 2.5 cm      L 2  R 1  same     Thumb Radial/Palmar abd ROM R:  L: 45      L 50   L 50   L52 L 55  R 60       Wrist ROM Ext/Flex R:  L: 61/74   L: 75/75  L 80/75  L 80/80  R 65/45   R 65/45   R65/60    R 75/70   Rad/Uln dev ROM R:  L: 15/35     L20/35  L 15/30   R 15/25         Forearm ROM  Sup/pron R:  L:     L 80/80  R 70/80      R 75/80    R80/85   Elbow ROM Ext/flex/ R:  L:            Should/er Flex  Shoulder Abd  Shoulder IR/ER              Edema in cm circumf. MCPJs R:  L: 18 cm     L 17.7 cm        Edema in cm circumf.   Wrist R:  L:             strength in lbs/ R:  L: deferred     deferred L 35#  R deferred  L 45#  R 21#  L 38#  R 28   Pinch Strengthin lbs: lat  R:  L:     L 8#  L 10#    R 12#  L 10  R 11   Pinch Strength in lbs:  3 point/ R:  L:        L 4#   pain  L 4#  R 8#  L 6  R 10   MMT:               Observations:  (including splints, bandages, incisions, scars): R wrist in a cast      No cast on R              MODALITIES: 8/5/20  8/20/20 8/25/20 9/3/20 9/10/20 9/15/20 9/17/20 9/22/20  8 weeks    Fluidotherapy (32656)       10'    Estim (48842/77265)           Paraffin (23066)           US (58025) Iontophoresis (98868)           Hot Pack 8'          Cold Pack                      INTERVENTIONS:           Therapeutic Exercise (38893)           AROM thumb X 10 each direction Th opposition to each digit and each jt of ea digit  Th ext   Th abd  10x Th opposition to each digit and each jt of ea digit    Wrist flex & UD with the flex/  5 x 15 sec hold ea AAROM   R Wrist ext /flex  RD/UD  Sup/pron   10 x2        L Wrist flex & UD with th flex/  5 x 15 sec hold ea AAROM   R Wrist ext /flex  RD/UD  Sup/pron   10 x2       R Dowel   Sup/pron  RD/UD  10 x    L Wrist flex & UD with th flex/  5 x 15 sec hold ea AAROM   R Wrist ext /flex  RD/UD  Sup/pron   10 x2    thumbciser  1 lg band  15 x2 bilat AAROM   R Wrist ext /flex  10x2    Red flexbar  bilat   wrist flex  Wrist ext   bend  pron  Sup  RD/UD  10x2   PROM L thumb MP flex    LTh flex with 9 hole pege    AAROM L th abd      Th stability exer with tennis ball  Full hand  IF & LF  Ext     C\" postiuring   AROM wrist  X 10 each direction Wrist ext with fingers ext &  Wrist flex with fingers flexed    RD/UD  10x  FA stretches 5x 15 sec hold ea  wristciser wristciser  vert 25x        1#  Wrist ext  Wrist flex  RD/UD  Sup/pron  10x2      R    2#  Wrist ext  Wrist flex  15x2  1/2# on dowel   Sup/pron  RD/UD  10x2     Th flex picking up beads placed on ulnar side of hand at middle crease of SF Th flex picking up beads placed on ulnar side of hand at middle crease of SF L Th flex picking up beads placed on ulnar side of hand at middle crease of SF L th flex 9 holepegs        Rope wristciser  5x         Therapeutic Activity (58068)  Th abd with bead    Gripping cones at large end and stacking.   8x3      20 beads 1 at a time, hold all, then manipulate to radial side of hand and release 1 at a time  20 beads 1 at a time, hold all, then manipulate to radial side of hand and release 1 at a time  bilat  velcro checkers under edge of table for wrist flex  4x3 sets Red clip and foam cubes   3 pt Pinch  Lat pinch          120 pennies , hold all, then place in slotted holes  Pink foam    Th flex   Pinch  10 x 2 bilat  Th flex with beads on ulnar side of hand                      Manual Therapy (61769)  Hawks   Dorsal FA and hand Hawks   Dorsal FA and hand DTM Hawks   Dorsal FA and hand R hawks  to FA     (IASTM, Dry Needling, manual mobilization)      Edema glove    L gel pad for L th scar due to being raised                Neuromuscular Reeducation (66400)                   Verbal cuing for proper tech              ADL Training (97302)                                 HEP Training/Review See sheet(s)   Added AAROM R wrist and FA  Added wrist strengthening to HEP  Added passive th flex with ace    Issued HEP as above see media                      Splinting           Lcode:  forearm based thumb spica           Orthotic Mgmt, Subsequent Enc (50310)           Orthotic Mgmt & Training (75164)                      Other:             Therapeutic Exercise & NMR:  [x] (78568) Provided verbal/tactile cueing for activities related to strengthening, flexibility, endurance, ROM  for improvements in scapular, scapulothoracic and UE control with self care, reaching, carrying, lifting, house/yardwork, driving/computer work. [x] (95385) Provided verbal/tactile cueing for activities related to improving balance, coordination, kinesthetic sense, posture, motor skill, proprioception  to assist with  scapular, scapulothoracic and UE control with self care, reaching, carrying, lifting, house/yardwork, driving/computer work.     Therapeutic Activities & NMR:    [x] (58745 or 98786) Provided verbal/tactile cueing for activities related to improving balance, coordination, kinesthetic sense, posture, motor skill, proprioception and motor activation to allow for proper function of scapular, scapulothoracic and UE control with self care, carrying, lifting, driving/computer work    Home Exercise Program:    [x] (45313) Reviewed/Progressed HEP activities related to strengthening, flexibility, endurance, ROM of scapular, scapulothoracic and UE control with self care, reaching, carrying, lifting, house/yardwork, driving/computer work  [] (90542) Reviewed/Progressed HEP activities related to improving balance, coordination, kinesthetic sense, posture, motor skill, proprioception of scapular, scapulothoracic and UE control with self care, reaching, carrying, lifting, house/yardwork, driving/computer work      Manual Treatments:  PROM / STM / Oscillations-Mobs:  G-I, II, III, IV (PA's, Inf., Post.)  [x] (08560) Provided manual therapy to mobilize soft tissue/joints of cervical/CT, scapular GHJ and UE for the purpose of modulating pain, promoting relaxation,  increasing ROM, reducing/eliminating soft tissue swelling/inflammation/restriction, improving soft tissue extensibility and allowing for proper ROM for normal function with self care, reaching, carrying, lifting, house/yardwork, driving/computer work    ADL Training:  [] (32543) Provided self-care/home management training related to activities of daily living and compensatory training, and/or use of adaptive equipment      Charges:  Timed Code Treatment Minutes: 45   Total Treatment Minutes: 45   Worker's Comp: Time In/Time Out     [] EVAL (LOW) 72147 (typically 20 minutes face-to-face)    [] EVAL (MOD) 68030 (typically 30 minutes face-to-face)  [] EVAL (HIGH) [phone] (typically 45 minutes face-to-face)  [] OT Re-eval (04691)       [x] Bebeto [phone]) x  2   [] IOFDX(46357)  [x] NMR (75081) x  1    [] Estim (attended) (94942)   [] Manual ([phone]) x      [] US (22650)  [] TA (11641) x   1   [] Paraffin (84473)  [] ADL  [phone]) x     [] Splint/L code:     [] Estim (unattended) [phone])  [] Fluidotherapy (94294)  [] DN 1-2 (67655)   [] DN 3+ ([phone])  [] Orthotic Mgmt, Subsequent Enc (27148)  [] Orthotic Mgmt & Training (91533)  [] Other:    ASSESSMENT:  Tolerated light strengthening well       GOALS:  Patient stated goal:   Regain full function of both hands and R wrist  [x]? ? Progressing: []?? Met: []?? Not Met: []?? Adjusted     Therapist goals for Patient:   Short Term Goals: To be achieved in: 2 weeks  1. Independent in HEP and progression per patient tolerance, in order to prevent re-injury.     [x]? ? Progressing: [x]? ? Met: []?? Not Met: []?? Adjusted   2. Patient will have a decrease in pain to facilitate improvement in movement, function, and ADLs as indicated by Functional Deficits.    []? ? Progressing: []?? Met: []?? Not Met: []?? Adjusted     Long Term Goals to be achieved in 5 weeks (through 10/8/20), including patient directed goals to address patient identified performance deficits:  1) Pt to be independent in graded HEP progression with a good level of effort and compliance. [x]? ? Progressing: []?? Met: []?? Not Met: []?? Adjusted   2) Pt to report a score of </= 25% on the Quick DASH disability questionnaire for increased performance with carrying, moving, and handling objects. []?? Progressing: []?? Met: [x]? ? Not Met: []?? Adjusted   3) Pt will demonstrate increased ROM to L thumb to touch base of small finger for improved and R wrist ext/flex 60/60 to increase independence with dressing and grooming .  [x]? ? Progressing: []?? Met: []?? Not Met: [x]? ? Adjusted   4) Pt will demonstrate increased strength to L pinch to 50% of R for improved and R wrist strength 5-/5 independence with buttoning and lifting and work tasks .  []?? Progressing: []?? Met: [x]? ? Not Met: []?? Adjusted   5) Pt will have a decrease in pain to 0-1/10 to facilitate work tasks .  [x]? ? Progressing: []?? Met: []?? Not Met: []?? Adjusted       Overall Progression Towards Functional Goals/Treatment Progress Update:  [x] Patient is progressing as expected towards functional goals listed.     [] Progression is slowed due to complexities/impairments listed. -   [] Progression has been slowed due to co-morbidities. [] Plan just implemented, too soon to assess goals progression <30 days  [] Goals require adjustment due to lack of progress  [] Patient is not progressing as expected and requires additional follow up with physician  [] All goals are met  [] Other:     Prognosis for POC: [x] Good [] Fair  [] Poor    Patient requires continued skilled intervention: [x] Yes  [] No    Treatment/Activity Tolerance:  [x] Patient able to complete treatment  [] Patient limited by fatigue  [] Patient limited by pain    [] Patient limited by other medical complications  [] Other:                  PLAN:  Progress ROM , strength and function while controlling pain per MD recommendations  [x] Continue per plan of care [x] Alter current plan (see comments above)decrease to 1x per week  [] Plan of care initiated [] Hold pending MD visit [] Discharge     Electronically signed by: Leeanne Mckeon OTR/L ALISSA  OT [id number]        Note: If patient does not return for scheduled/ recommended follow up visits, this note will serve as a discharge from care along with most recent update on progress.

## 2020-09-24 ENCOUNTER — HOSPITAL ENCOUNTER (OUTPATIENT)
Dept: OCCUPATIONAL THERAPY | Age: 70
Setting detail: THERAPIES SERIES
Discharge: HOME | End: 2020-09-24
Payer: MEDICARE

## 2020-09-24 PROCEDURE — 97110 THERAPEUTIC EXERCISES: CPT | Performed by: OCCUPATIONAL THERAPIST

## 2020-09-24 PROCEDURE — 97112 NEUROMUSCULAR REEDUCATION: CPT | Performed by: OCCUPATIONAL THERAPIST

## 2020-09-24 PROCEDURE — 97140 MANUAL THERAPY 1/> REGIONS: CPT | Performed by: OCCUPATIONAL THERAPIST

## 2020-09-24 NOTE — FLOWSHEET NOTE
[facility], [facility] [place] [address]  Phone [phone]      Fax [phone]      Occupational Therapy Treatment Note/ Progress Report:     Is this a Progress Report:     []  Yes  [x]  No      If Yes:  Date Range for reporting period:  Beginning 20  Ending  Progress report will be due (10 Rx or 30 days whichever is less):      Recertification will be due (POC Duration  / 90 days whichever is less): 10/8/20  Date:  2020  Patient Name:  Ang Ivy    :  1950  MRN: [id number]  Medical/Treatment Diagnosis Information:  · Diagnosis: L thumb bennets fracture s/p ORIF    S52.571D (ICD-10-CM) - Other closed intra-articular fracture   · L hand stiffness M26.642  R wrist stiffness M25.631  ·       Insurance/Certification information:  OT Insurance Information: A[employer]na Medicare $40 copay 100% Med nec  Physician Information:  Referring Practitioner: Dr. Shirley Davis  Has the plan of care been signed (Y/N):        []  Yes  [x]  No   Comorbidities Affecting Functional Performance:     [x]Anxiety (F41.9)/Depression (F32.9)   []Diabetes Type 1(E10.65) or 2 (E11.65)   []Rheumatoid Arthritis (M05.9)  []Fibromyalgia (M79.7)  []Neuropathy(G60.9)  []Osteoarthritis(M19.91)  []None   []Other:    Visit # Insurance Allowable Auth Required   9 MN []  Yes []  No    From 20  to 2020  Right wrist distal radius fracture.  She also has an underlying scapholunate ligament disruption that I do not know whether is new or old radiographically    L  Guzmán's fracture  Date of Injury: 20  Guzmán's fracture that we performed open reduction internal fixation on the left thumb.   Date of Surgery: 20   Date of Patient follow up with Physician: 4 weeks  RESTRICTIONS/PRECAUTIONS: FA based thumb spica     Latex Allergy:  [x]No      []Yes  Pacemaker:  [x] No       [] Yes   Preferred Language for Healthcare: [x]English       []other:   Functional Scale:   16 % ( initial )82% (Quick DASH)   Date assessed:  9/17/2020     Pain Scale: 2/10  Current: R 1/10  L 1/10    SUBJECTIVE: Feels she has decreased strength           OBJECTIVE:   Date:  Hand Dominance:     [x]? Right    []? Left 8/5/2020 8/13/20 8/18/20 8/20/20  3.5 wks 8/25/20  4 wks  9/3/20 9/10/20 9/15/20 9/17/20 9/22/20   Objective Measures:       Recert to add R wrist       PAIN 2/10 2/10  2/10 with th abd 2/10 0-2/10 L 0-2/10  R 3-4/10 L  1-2/10  R  Wrist 3-4/10      Quick DASH 82%                                                                        Digits tip to DPFC in cm WNLS     WNL bilat       Thumb ROM MP 15  IP 14 MP 25  IP 59  30  55 30  60     L  /30  /60    R /45  /45        L/30  /60      R/55  /35     Thumb tip to DPFC  R:  L: 2.5 cm      L 2  R 1  same     Thumb Radial/Palmar abd ROM R:  L: 45      L 50   L 50   L52 L 55  R 60       Wrist ROM Ext/Flex R:  L: 61/74   L: 75/75  L 80/75  L 80/80  R 65/45   R 65/45   R65/60    R 75/70   Rad/Uln dev ROM R:  L: 15/35     L20/35  L 15/30   R 15/25         Forearm ROM  Sup/pron R:  L:     L 80/80  R 70/80      R 75/80    R80/85   Elbow ROM Ext/flex/ R:  L:            Should/er Flex  Shoulder Abd  Shoulder IR/ER              Edema in cm circumf. MCPJs R:  L: 18 cm     L 17.7 cm        Edema in cm circumf.   Wrist R:  L:             strength in lbs/ R:  L: deferred     deferred L 35#  R deferred  L 45#  R 21#  L 38#  R 28   Pinch Strengthin lbs: lat  R:  L:     L 8#  L 10#    R 12#  L 10  R 11   Pinch Strength in lbs:  3 point/ R:  L:        L 4#   pain  L 4#  R 8#  L 6  R 10   MMT:               Observations:  (including splints, bandages, incisions, scars): R wrist in a cast      No cast on R              MODALITIES: 8/5/20  8/20/20 8/25/20 9/3/20 9/10/20 9/15/20 9/17/20 9/22/20  8 weeks    Fluidotherapy (15246)       10'    Estim (63770/33394)           Paraffin (24777)           US (43454) Iontophoresis (20870)           Hot Pack 8'          Cold Pack                      INTERVENTIONS:           Therapeutic Exercise (47481)           AROM thumb X 10 each direction Th opposition to each digit and each jt of ea digit  Th ext   Th abd  10x Th opposition to each digit and each jt of ea digit    Wrist flex & UD with the flex/  5 x 15 sec hold ea AAROM   R Wrist ext /flex  RD/UD  Sup/pron   10 x2        L Wrist flex & UD with th flex/  5 x 15 sec hold ea AAROM   R Wrist ext /flex  RD/UD  Sup/pron   10 x2       R Dowel   Sup/pron  RD/UD  10 x    L Wrist flex & UD with th flex/  5 x 15 sec hold ea AAROM   R Wrist ext /flex  RD/UD  Sup/pron   10 x2    thumbciser  1 lg band  15 x2 bilat AAROM   R Wrist ext /flex  10x2    Red flexbar  bilat   wrist flex  Wrist ext   bend  pron  Sup  RD/UD  10x2   PROM L thumb MP flex    LTh flex with 9 hole pege    AAROM L th abd      Th stability exer with tennis ball  Full hand  IF & LF  Ext     C\" postiuring   AROM wrist  X 10 each direction Wrist ext with fingers ext &  Wrist flex with fingers flexed    RD/UD  10x  FA stretches 5x 15 sec hold ea  wristciser wristciser  vert 25x        1#  Wrist ext  Wrist flex  RD/UD  Sup/pron  10x2      R    2#  Wrist ext  Wrist flex  15x2  1/2# on dowel   Sup/pron  RD/UD  10x2     Th flex picking up beads placed on ulnar side of hand at middle crease of SF Th flex picking up beads placed on ulnar side of hand at middle crease of SF L Th flex picking up beads placed on ulnar side of hand at middle crease of SF L th flex 9 holepegs        Rope wristciser  5x         Therapeutic Activity (48166)  Th abd with bead    Gripping cones at large end and stacking.   8x3      20 beads 1 at a time, hold all, then manipulate to radial side of hand and release 1 at a time  20 beads 1 at a time, hold all, then manipulate to radial side of hand and release 1 at a time  bilat  velcro checkers under edge of table for wrist flex  4x3 sets Red clip and foam cubes   3 pt Pinch  Lat pinch          120 pennies , hold all, then place in slotted holes  Pink foam    Th flex   Pinch  10 x 2 bilat  Th flex with beads on ulnar side of hand                      Manual Therapy (56505)  Hawks   Dorsal FA and hand Hawks   Dorsal FA and hand DTM Hawks   Dorsal FA and hand R hawks  to FA     (IASTM, Dry Needling, manual mobilization)      Edema glove    L gel pad for L th scar due to being raised                Neuromuscular Reeducation (47711)                   Verbal cuing for proper tech              ADL Training (16371)                                 HEP Training/Review See sheet(s)   Added AAROM R wrist and FA  Added wrist strengthening to HEP  Added passive th flex with ace    Issued HEP as above see media                      Splinting           Lcode:  forearm based thumb spica           Orthotic Mgmt, Subsequent Enc (01567)           Orthotic Mgmt & Training (70391)                      Other:             Therapeutic Exercise & NMR:  [x] (78332) Provided verbal/tactile cueing for activities related to strengthening, flexibility, endurance, ROM  for improvements in scapular, scapulothoracic and UE control with self care, reaching, carrying, lifting, house/yardwork, driving/computer work. [x] (62592) Provided verbal/tactile cueing for activities related to improving balance, coordination, kinesthetic sense, posture, motor skill, proprioception  to assist with  scapular, scapulothoracic and UE control with self care, reaching, carrying, lifting, house/yardwork, driving/computer work.     Therapeutic Activities & NMR:    [x] (61267 or 44486) Provided verbal/tactile cueing for activities related to improving balance, coordination, kinesthetic sense, posture, motor skill, proprioception and motor activation to allow for proper function of scapular, scapulothoracic and UE control with self care, carrying, lifting, driving/computer work    Home Exercise Program:    [x] (02503) Reviewed/Progressed HEP activities related to strengthening, flexibility, endurance, ROM of scapular, scapulothoracic and UE control with self care, reaching, carrying, lifting, house/yardwork, driving/computer work  [] (82992) Reviewed/Progressed HEP activities related to improving balance, coordination, kinesthetic sense, posture, motor skill, proprioception of scapular, scapulothoracic and UE control with self care, reaching, carrying, lifting, house/yardwork, driving/computer work      Manual Treatments:  PROM / STM / Oscillations-Mobs:  G-I, II, III, IV (PA's, Inf., Post.)  [x] (48609) Provided manual therapy to mobilize soft tissue/joints of cervical/CT, scapular GHJ and UE for the purpose of modulating pain, promoting relaxation,  increasing ROM, reducing/eliminating soft tissue swelling/inflammation/restriction, improving soft tissue extensibility and allowing for proper ROM for normal function with self care, reaching, carrying, lifting, house/yardwork, driving/computer work    ADL Training:  [] (24828) Provided self-care/home management training related to activities of daily living and compensatory training, and/or use of adaptive equipment      Charges:  Timed Code Treatment Minutes: 45   Total Treatment Minutes: 45   Worker's Comp: Time In/Time Out     [] EVAL (LOW) 38728 (typically 20 minutes face-to-face)    [] EVAL (MOD) 82295 (typically 30 minutes face-to-face)  [] EVAL (HIGH) [phone] (typically 45 minutes face-to-face)  [] OT Re-eval (29068)       [x] Bebeto [phone]) x  2   [] YLWLX(39999)  [x] NMR (66262) x  1    [] Estim (attended) (37962)   [] Manual ([phone]) x      [] US (42319)  [] TA (47852) x   1   [] Paraffin (81706)  [] ADL  [phone]) x     [] Splint/L code:     [] Estim (unattended) [phone])  [] Fluidotherapy (75769)  [] DN 1-2 (46227)   [] DN 3+ ([phone])  [] Orthotic Mgmt, Subsequent Enc (53648)  [] Orthotic Mgmt & Training (82277)  [] Other:    ASSESSMENT:  Tolerated light strengthening well       GOALS:  Patient stated goal:   Regain full function of both hands and R wrist  [x]? ? Progressing: []?? Met: []?? Not Met: []?? Adjusted     Therapist goals for Patient:   Short Term Goals: To be achieved in: 2 weeks  1. Independent in HEP and progression per patient tolerance, in order to prevent re-injury.     [x]? ? Progressing: [x]? ? Met: []?? Not Met: []?? Adjusted   2. Patient will have a decrease in pain to facilitate improvement in movement, function, and ADLs as indicated by Functional Deficits.    []? ? Progressing: []?? Met: []?? Not Met: []?? Adjusted     Long Term Goals to be achieved in 5 weeks (through 10/8/20), including patient directed goals to address patient identified performance deficits:  1) Pt to be independent in graded HEP progression with a good level of effort and compliance. [x]? ? Progressing: []?? Met: []?? Not Met: []?? Adjusted   2) Pt to report a score of </= 25% on the Quick DASH disability questionnaire for increased performance with carrying, moving, and handling objects. []?? Progressing: []?? Met: [x]? ? Not Met: []?? Adjusted   3) Pt will demonstrate increased ROM to L thumb to touch base of small finger for improved and R wrist ext/flex 60/60 to increase independence with dressing and grooming .  [x]? ? Progressing: []?? Met: []?? Not Met: [x]? ? Adjusted   4) Pt will demonstrate increased strength to L pinch to 50% of R for improved and R wrist strength 5-/5 independence with buttoning and lifting and work tasks .  []?? Progressing: []?? Met: [x]? ? Not Met: []?? Adjusted   5) Pt will have a decrease in pain to 0-1/10 to facilitate work tasks .  [x]? ? Progressing: []?? Met: []?? Not Met: []?? Adjusted       Overall Progression Towards Functional Goals/Treatment Progress Update:  [x] Patient is progressing as expected towards functional goals listed.     [] Progression is slowed due to complexities/impairments listed. -   [] Progression has been slowed due to co-morbidities. [] Plan just implemented, too soon to assess goals progression <30 days  [] Goals require adjustment due to lack of progress  [] Patient is not progressing as expected and requires additional follow up with physician  [] All goals are met  [] Other:     Prognosis for POC: [x] Good [] Fair  [] Poor    Patient requires continued skilled intervention: [x] Yes  [] No    Treatment/Activity Tolerance:  [x] Patient able to complete treatment  [] Patient limited by fatigue  [] Patient limited by pain    [] Patient limited by other medical complications  [] Other:                  PLAN:  Progress ROM , strength and function while controlling pain per MD recommendations  [x] Continue per plan of care [x] Alter current plan (see comments above)decrease to 1x per week  [] Plan of care initiated [] Hold pending MD visit [] Discharge     Electronically signed by: Winsome Eng OTR/L T  OT [id number]        Note: If patient does not return for scheduled/ recommended follow up visits, this note will serve as a discharge from care along with most recent update on progress.

## 2020-09-24 NOTE — FLOWSHEET NOTE
[facility], [employer]  [facility] [place] [address]  Phone [phone]      Fax [phone]      Occupational Therapy Treatment Note/ Progress Report:     Is this a Progress Report:     []  Yes  [x]  No      If Yes:  Date Range for reporting period:  Beginning 20  Ending  Progress report will be due (10 Rx or 30 days whichever is less):  11/3/98    Recertification will be due (POC Duration  / 90 days whichever is less): 10/8/20  Date:  2020  Patient Name:  Travis Emmanuel    :  1950  MRN: [id number]  Medical/Treatment Diagnosis Information:  · Diagnosis: L thumb bennets fracture s/p ORIF    S52.571D (ICD-10-CM) - Other closed intra-articular fracture   · L hand stiffness M26.642  R wrist stiffness M25.631  ·       Insurance/Certification information:  OT Insurance Information: Aetna Medicare $40 copay 100% Med nec  Physician Information:  Referring Practitioner: Dr. Nico Carrasquillo  Has the plan of care been signed (Y/N):        []  Yes  [x]  No   Comorbidities Affecting Functional Performance:     [x]Anxiety (F41.9)/Depression (F32.9)   []Diabetes Type 1(E10.65) or 2 (E11.65)   []Rheumatoid Arthritis (M05.9)  []Fibromyalgia (M79.7)  []Neuropathy(G60.9)  []Osteoarthritis(M19.91)  []None   []Other:    Visit # Insurance Allowable Auth Required   10 MN []  Yes []  No    From 20  to 2020  Right wrist distal radius fracture.  She also has an underlying scapholunate ligament disruption that I do not know whether is new or old radiographically    L  Guzmán's fracture  Date of Injury: 20  Guzmán's fracture that we performed open reduction internal fixation on the left thumb.   Date of Surgery: 20   Date of Patient follow up with Physician: 4 weeks  RESTRICTIONS/PRECAUTIONS: FA based thumb spica     Latex Allergy:  [x]No      []Yes  Pacemaker:  [x] No       [] Yes   Preferred Language for Healthcare: [x]English       []other:   Functional Scale:   16 % ( initial )82% (Quick DASH)   Date assessed:  9/17/2020     Pain Scale: 2/10  Current: R0/10  L 0/10    SUBJECTIVE:  Cont to feels she has decreased strength ; having difficulty lifting large files at work          OBJECTIVE:   Date:  Hand Dominance:     [x]? Right    []? Left 8/5/2020 8/13/20 8/18/20 8/20/20  3.5 wks 8/25/20  4 wks  9/3/20 9/10/20 9/15/20 9/17/20 9/22/20   9/24/20  discharge   Objective Measures:       Recert to add R wrist        PAIN 2/10 2/10  2/10 with th abd 2/10 0-2/10 L 0-2/10  R 3-4/10 L  1-2/10  R  Wrist 3-4/10    L 0/10  R 0/10   Quick DASH 82%                                                                             Digits tip to DPFC in cm WNLS     WNL bilat        Thumb ROM MP 15  IP 14 MP 25  IP 59  30  55 30  60     L  /30  /60    R /45  /45        L/30  /60      R/55  /35   L /35  /60   Thumb tip to DPFC  R:  L: 2.5 cm      L 2  R 1  same   L1.5  R1   Thumb Radial/Palmar abd ROM R:  L: 45      L 50   L 50   L52 L 55  R 60     L55  R 60   Wrist ROM Ext/Flex R:  L: 61/74   L: 75/75  L 80/75  L 80/80  R 65/45   R 65/45   R65/60    R 75/70   R 65/65   Rad/Uln dev ROM R:  L: 15/35     L20/35  L 15/30   R 15/25          Forearm ROM  Sup/pron R:  L:     L 80/80  R 70/80      R 75/80    R80/85    Elbow ROM Ext/flex/ R:  L:             Should/er Flex  Shoulder Abd  Shoulder IR/ER               Edema in cm circumf. MCPJs R:  L: 18 cm     L 17.7 cm         Edema in cm circumf.   Wrist R:  L:              strength in lbs/ R:  L: deferred     deferred L 35#  R deferred  L 45#  R 21#  L 38#  R 28    Pinch Strengthin lbs: lat  R:  L:     L 8#  L 10#    R 12#  L 10  R 11    Pinch Strength in lbs:  3 point/ R:  L:        L 4#   pain  L 4#  R 8#  L 6  R 10    MMT:                Observations:  (including splints, bandages, incisions, scars): R wrist in a cast      No cast on R               MODALITIES: 8/5/20  8/20/20 8/25/20 9/3/20 9/10/20 9/15/20 9/17/20 9/22/20  8 weeks  9/24/20     Fluidotherapy (92521)       10'     Estim (10306/97245)            Paraffin (84884)            US (37897)            Iontophoresis (20194)            Hot Pack 8'           Cold Pack                        INTERVENTIONS:            Therapeutic Exercise (66216)            AROM thumb X 10 each direction Th opposition to each digit and each jt of ea digit  Th ext   Th abd  10x Th opposition to each digit and each jt of ea digit    Wrist flex & UD with the flex/  5 x 15 sec hold ea AAROM   R Wrist ext /flex  RD/UD  Sup/pron   10 x2        L Wrist flex & UD with th flex/  5 x 15 sec hold ea AAROM   R Wrist ext /flex  RD/UD  Sup/pron   10 x2       R Dowel   Sup/pron  RD/UD  10 x    L Wrist flex & UD with th flex/  5 x 15 sec hold ea AAROM   R Wrist ext /flex  RD/UD  Sup/pron   10 x2    thumbciser  1 lg band  15 x2 bilat AAROM   R Wrist ext /flex  10x2    Red flexbar  bilat   wrist flex  Wrist ext   bend  pron  Sup  RD/UD  10x2   PROM L thumb MP flex    LTh flex with 9 hole pege    AAROM L th abd      Th stability exer with tennis ball  Full hand  IF & LF  Ext     C\" postiuring AAROM th abd    R th ext      Th stability exer with tennis ball  Full hand  IF & LF  Ext     C\" posturing       AROM wrist  X 10 each direction Wrist ext with fingers ext &  Wrist flex with fingers flexed    RD/UD  10x  FA stretches 5x 15 sec hold ea  wristciser wristciser  vert 25x        1#  Wrist ext  Wrist flex  RD/UD  Sup/pron  10x2      R    2#  Wrist ext  Wrist flex  15x2  1/2# on dowel   Sup/pron  RD/UD  10x2     R    2#  Wrist ext  Wrist flex  15x2  1/2# on dowel   Sup/pron  RD/UD  10x2     Th flex picking up beads placed on ulnar side of hand at middle crease of SF Th flex picking up beads placed on ulnar side of hand at middle crease of SF L Th flex picking up beads placed on ulnar side of hand at middle crease of SF L th flex 9 holepegs         Rope wristciser  5x          Therapeutic Activity (60055)  Th abd with bead    Gripping cones at large end and stacking. 8x3      20 beads 1 at a time, hold all, then manipulate to radial side of hand and release 1 at a time  20 beads 1 at a time, hold all, then manipulate to radial side of hand and release 1 at a time  bilat  velcro checkers under edge of table for wrist flex  4x3 sets Red clip and foam cubes   3 pt Pinch  Lat pinch           120 pennies , hold all, then place in slotted holes  Pink foam    Th flex   Pinch  10 x 2 bilat  Th flex with beads on ulnar side of hand                        Manual Therapy (40845)  Hawks   Dorsal FA and hand Hawks   Dorsal FA and hand DTM Hawks   Dorsal FA and hand R hawks  to [employer]   Massage to web space   (IASTM, Dry Needling, manual mobilization)      Edema glove    L gel pad for L th scar due to being raised                  Neuromuscular Reeducation (39326)                    Verbal cuing for proper tech                ADL Training (85974)         Joint protection tech                           HEP Training/Review See sheet(s)   Added AAROM R wrist and FA  Added wrist strengthening to HEP  Added passive th flex with ace     Issued HEP as above see media                        Splinting            Lcode:  forearm based thumb spica            Orthotic Mgmt, Subsequent Enc (18399)            Orthotic Mgmt & Training (01066)                        Other:              Therapeutic Exercise & NMR:  [x] (77859) Provided verbal/tactile cueing for activities related to strengthening, flexibility, endurance, ROM  for improvements in scapular, scapulothoracic and UE control with self care, reaching, carrying, lifting, house/yardwork, driving/computer work.     [x] (53681) Provided verbal/tactile cueing for activities related to improving balance, coordination, kinesthetic sense, posture, motor skill, proprioception  to assist with  scapular, scapulothoracic and UE control and work tasks .  []?? Progressing: []?? Met: [x]? ? Not Met: []?? Adjusted   5) Pt will have a decrease in pain to 0-1/10 to facilitate work tasks .  []?? Progressing: [x]? ? Met: []?? Not Met: []?? Adjusted       Overall Progression Towards Functional Goals/Treatment Progress Update:  [x] Patient is progressing as expected towards functional goals listed. [] Progression is slowed due to complexities/impairments listed. -   [] Progression has been slowed due to co-morbidities. [] Plan just implemented, too soon to assess goals progression <30 days  [] Goals require adjustment due to lack of progress  [] Patient is not progressing as expected and requires additional follow up with physician  [] All goals are met  [] Other:     Prognosis for POC: [x] Good [] Fair  [] Poor    Patient requires continued skilled intervention: [x] Yes  [] No    Treatment/Activity Tolerance:  [x] Patient able to complete treatment  [] Patient limited by fatigue  [] Patient limited by pain    [] Patient limited by other medical complications  [] Other:                  PLAN:  Progress ROM , strength and function while controlling pain per MD recommendations  [x] Continue per plan of care [] Alter current plan (see comments above)decrease to 1x per week  [] Plan of care initiated [] Hold pending MD visit [x] Discharge     Electronically signed by: Mckenna Mohan OTMICHAEL/ANUP T  OT [id number]        Note: If patient does not return for scheduled/ recommended follow up visits, this note will serve as a discharge from care along with most recent update on progress.

## 2020-09-29 ENCOUNTER — APPOINTMENT (OUTPATIENT)
Dept: OCCUPATIONAL THERAPY | Age: 70
End: 2020-09-29
Payer: MEDICARE

## 2020-09-30 ENCOUNTER — OFFICE VISIT (OUTPATIENT)
Dept: ORTHOPEDIC SURGERY | Age: 70
End: 2020-09-30

## 2020-09-30 VITALS — WEIGHT: 160 LBS | HEIGHT: 66 IN | BODY MASS INDEX: 25.71 KG/M2

## 2020-09-30 PROCEDURE — 99024 POSTOP FOLLOW-UP VISIT: CPT | Performed by: ORTHOPAEDIC SURGERY

## 2020-10-20 ENCOUNTER — TELEPHONE (OUTPATIENT)
Dept: FAMILY MEDICINE CLINIC | Age: 70
End: 2020-10-20

## 2020-10-20 NOTE — TELEPHONE ENCOUNTER
Yasir Mulligan called to request an order be sent for patient to have a bone density test due to a recent fracture    Please advise

## 2020-12-08 ENCOUNTER — TELEPHONE (OUTPATIENT)
Dept: FAMILY MEDICINE CLINIC | Age: 70
End: 2020-12-08

## 2020-12-08 NOTE — TELEPHONE ENCOUNTER
----- Message from Aldo Arcos sent at 12/7/2020 10:50 AM EST -----  Subject: Message to Provider    QUESTIONS  Information for Provider? trouble with double vision she wants to know if   she is supposed to get a call about scheduling an MRI   ---------------------------------------------------------------------------  --------------  [address]  What is the best way for the office to contact you? OK to leave message on   voicemail  Preferred Call Back Phone Number? [phone]  ---------------------------------------------------------------------------  --------------  SCRIPT ANSWERS  Relationship to Patient?  Self

## 2020-12-09 ENCOUNTER — HOSPITAL ENCOUNTER (OUTPATIENT)
Age: 70
Discharge: HOME | End: 2020-12-09
Payer: MEDICARE

## 2020-12-09 ENCOUNTER — HOSPITAL ENCOUNTER (OUTPATIENT)
Dept: GENERAL RADIOLOGY | Age: 70
Discharge: HOME | End: 2020-12-09
Payer: MEDICARE

## 2020-12-09 LAB
A/G RATIO: 1.7 (ref 1.1–2.2)
ALBUMIN SERPL-MCNC: 4.5 G/DL (ref 3.4–5)
ALP BLD-CCNC: 70 U/L (ref 40–129)
ALT SERPL-CCNC: 18 U/L (ref 10–40)
ANION GAP SERPL CALCULATED.3IONS-SCNC: 11 MMOL/L (ref 3–16)
AST SERPL-CCNC: 21 U/L (ref 15–37)
BASOPHILS ABSOLUTE: 0 K/UL (ref 0–0.2)
BASOPHILS RELATIVE PERCENT: 0.6 %
BILIRUB SERPL-MCNC: 0.6 MG/DL (ref 0–1)
BUN BLDV-MCNC: 21 MG/DL (ref 7–20)
CALCIUM SERPL-MCNC: 9.1 MG/DL (ref 8.3–10.6)
CHLORIDE BLD-SCNC: 104 MMOL/L (ref 99–110)
CHOLESTEROL, FASTING: 187 MG/DL (ref 0–199)
CO2: 26 MMOL/L (ref 21–32)
CREAT SERPL-MCNC: 0.6 MG/DL (ref 0.6–1.2)
EOSINOPHILS ABSOLUTE: 0.1 K/UL (ref 0–0.6)
EOSINOPHILS RELATIVE PERCENT: 3 %
GFR AFRICAN AMERICAN: >60
GFR NON-AFRICAN AMERICAN: >60
GLOBULIN: 2.6 G/DL
GLUCOSE FASTING: 88 MG/DL (ref 70–99)
HCT VFR BLD CALC: 41.4 % (ref 36–48)
HDLC SERPL-MCNC: 66 MG/DL (ref 40–60)
HEMOGLOBIN: 13.6 G/DL (ref 12–16)
LDL CHOLESTEROL CALCULATED: 99 MG/DL
LYMPHOCYTES ABSOLUTE: 1.8 K/UL (ref 1–5.1)
LYMPHOCYTES RELATIVE PERCENT: 37.9 %
MCH RBC QN AUTO: 30.4 PG (ref 26–34)
MCHC RBC AUTO-ENTMCNC: 32.8 G/DL (ref 31–36)
MCV RBC AUTO: 92.7 FL (ref 80–100)
MONOCYTES ABSOLUTE: 0.3 K/UL (ref 0–1.3)
MONOCYTES RELATIVE PERCENT: 6.4 %
NEUTROPHILS ABSOLUTE: 2.5 K/UL (ref 1.7–7.7)
NEUTROPHILS RELATIVE PERCENT: 52.1 %
PDW BLD-RTO: 13.1 % (ref 12.4–15.4)
PLATELET # BLD: 309 K/UL (ref 135–450)
PMV BLD AUTO: 8.3 FL (ref 5–10.5)
POTASSIUM SERPL-SCNC: 4.2 MMOL/L (ref 3.5–5.1)
RBC # BLD: 4.47 M/UL (ref 4–5.2)
SODIUM BLD-SCNC: 141 MMOL/L (ref 136–145)
TOTAL PROTEIN: 7.1 G/DL (ref 6.4–8.2)
TRIGLYCERIDE, FASTING: 110 MG/DL (ref 0–150)
TSH REFLEX: 1.74 UIU/ML (ref 0.27–4.2)
VLDLC SERPL CALC-MCNC: 22 MG/DL
WBC # BLD: 4.7 K/UL (ref 4–11)

## 2020-12-09 PROCEDURE — 36415 COLL VENOUS BLD VENIPUNCTURE: CPT

## 2020-12-09 PROCEDURE — 84443 ASSAY THYROID STIM HORMONE: CPT

## 2020-12-09 PROCEDURE — 80061 LIPID PANEL: CPT

## 2020-12-09 PROCEDURE — 85025 COMPLETE CBC W/AUTO DIFF WBC: CPT

## 2020-12-09 PROCEDURE — 80053 COMPREHEN METABOLIC PANEL: CPT

## 2020-12-09 PROCEDURE — 77080 DXA BONE DENSITY AXIAL: CPT

## 2020-12-09 NOTE — TELEPHONE ENCOUNTER
Must be seen. She apparently has an appointment on the 17th.   If symptoms are worse she can see an optometrist.  Otherwise keep her appointment on the 17th

## 2020-12-09 NOTE — TELEPHONE ENCOUNTER
Spoke with pt, she has seen several specialist for this. She fell back in July , hit head has had double vision since. She has been sent to a specialist at [facility] who wants this MRI- see consult note under media, but she can not order the MRI due to being at [facility], so asked that PCP order this.   She has to follow up with this doctor again in about a month and they want this done before she comes back

## 2020-12-10 ASSESSMENT — LIFESTYLE VARIABLES
HOW OFTEN DO YOU HAVE SIX OR MORE DRINKS ON ONE OCCASION: 0
HOW OFTEN DO YOU HAVE A DRINK CONTAINING ALCOHOL: 2
HOW OFTEN DO YOU HAVE SIX OR MORE DRINKS ON ONE OCCASION: NEVER
HOW MANY STANDARD DRINKS CONTAINING ALCOHOL DO YOU HAVE ON A TYPICAL DAY: 0
HOW OFTEN DURING THE LAST YEAR HAVE YOU FAILED TO DO WHAT WAS NORMALLY EXPECTED FROM YOU BECAUSE OF DRINKING: NEVER
HOW MANY STANDARD DRINKS CONTAINING ALCOHOL DO YOU HAVE ON A TYPICAL DAY: ONE OR TWO
HAS A RELATIVE, FRIEND, DOCTOR, OR ANOTHER HEALTH PROFESSIONAL EXPRESSED CONCERN ABOUT YOUR DRINKING OR SUGGESTED YOU CUT DOWN: NO
HOW OFTEN DURING THE LAST YEAR HAVE YOU NEEDED AN ALCOHOLIC DRINK FIRST THING IN THE MORNING TO GET YOURSELF GOING AFTER A NIGHT OF HEAVY DRINKING: 0
HAS A RELATIVE, FRIEND, DOCTOR, OR ANOTHER HEALTH PROFESSIONAL EXPRESSED CONCERN ABOUT YOUR DRINKING OR SUGGESTED YOU CUT DOWN: 0
HOW OFTEN DURING THE LAST YEAR HAVE YOU FOUND THAT YOU WERE NOT ABLE TO STOP DRINKING ONCE YOU HAD STARTED: NEVER
HOW OFTEN DURING THE LAST YEAR HAVE YOU NEEDED AN ALCOHOLIC DRINK FIRST THING IN THE MORNING TO GET YOURSELF GOING AFTER A NIGHT OF HEAVY DRINKING: NEVER
AUDIT TOTAL SCORE: 2
AUDIT-C TOTAL SCORE: 0
HOW OFTEN DURING THE LAST YEAR HAVE YOU HAD A FEELING OF GUILT OR REMORSE AFTER DRINKING: NEVER
HAVE YOU OR SOMEONE ELSE BEEN INJURED AS A RESULT OF YOUR DRINKING: NO
HOW OFTEN DURING THE LAST YEAR HAVE YOU FOUND THAT YOU WERE NOT ABLE TO STOP DRINKING ONCE YOU HAD STARTED: 0
HOW OFTEN DURING THE LAST YEAR HAVE YOU HAD A FEELING OF GUILT OR REMORSE AFTER DRINKING: 0
HOW OFTEN DURING THE LAST YEAR HAVE YOU FAILED TO DO WHAT WAS NORMALLY EXPECTED FROM YOU BECAUSE OF DRINKING: 0
HOW OFTEN DO YOU HAVE A DRINK CONTAINING ALCOHOL: TWO TO FOUR TIMES A MONTH
HAVE YOU OR SOMEONE ELSE BEEN INJURED AS A RESULT OF YOUR DRINKING: 0
AUDIT-C TOTAL SCORE: 2
HOW OFTEN DURING THE LAST YEAR HAVE YOU BEEN UNABLE TO REMEMBER WHAT HAPPENED THE NIGHT BEFORE BECAUSE YOU HAD BEEN DRINKING: 0
AUDIT TOTAL SCORE: 0
HOW OFTEN DURING THE LAST YEAR HAVE YOU BEEN UNABLE TO REMEMBER WHAT HAPPENED THE NIGHT BEFORE BECAUSE YOU HAD BEEN DRINKING: NEVER

## 2020-12-10 ASSESSMENT — PATIENT HEALTH QUESTIONNAIRE - PHQ9
SUM OF ALL RESPONSES TO PHQ QUESTIONS 1-9: 2
2. FEELING DOWN, DEPRESSED OR HOPELESS: 1
SUM OF ALL RESPONSES TO PHQ QUESTIONS 1-9: 2
SUM OF ALL RESPONSES TO PHQ QUESTIONS 1-9: 2
1. LITTLE INTEREST OR PLEASURE IN DOING THINGS: 1
SUM OF ALL RESPONSES TO PHQ9 QUESTIONS 1 & 2: 2

## 2020-12-17 ENCOUNTER — OFFICE VISIT (OUTPATIENT)
Dept: FAMILY MEDICINE CLINIC | Age: 70
End: 2020-12-17
Payer: MEDICARE

## 2020-12-17 VITALS
SYSTOLIC BLOOD PRESSURE: 120 MMHG | TEMPERATURE: 96.6 F | WEIGHT: 170 LBS | DIASTOLIC BLOOD PRESSURE: 80 MMHG | BODY MASS INDEX: 27.03 KG/M2

## 2020-12-17 PROCEDURE — G0439 PPPS, SUBSEQ VISIT: HCPCS | Performed by: FAMILY MEDICINE

## 2020-12-17 RX ORDER — CITALOPRAM 20 MG/1
20 TABLET ORAL DAILY
Qty: 90 TABLET | Refills: 1 | Status: SHIPPED | OUTPATIENT
Start: 2020-12-17 | End: 2021-06-15

## 2020-12-17 RX ORDER — PRAVASTATIN SODIUM 40 MG
TABLET ORAL
Qty: 90 TABLET | Refills: 3 | Status: SHIPPED | OUTPATIENT
Start: 2020-12-17 | End: 2021-12-10

## 2020-12-17 NOTE — PROGRESS NOTES
Medicare Annual Wellness Visit  Name: Melva Kirkty Date: 2020   MRN: [id number] Sex: Female   Age: 72 y.o. Ethnicity: Non-/Non    : 1950 Race: Bhanu Stage is here for Medicare AWV (Discuss citalopram, possible dosage increase.)    Screenings for behavioral, psychosocial and functional/safety risks, and cognitive dysfunction are all negative except as indicated below. These results, as well as other patient data from the [address] form, are documented in Flowsheets linked to this Encounter. Allergies   Allergen Reactions    Pneumococcal Vaccines Other (See Comments)     Large local reaction    Prednisone Palpitations     dizziness         Prior to Visit Medications    Medication Sig Taking?  Authorizing Provider   pravastatin (PRAVACHOL) 40 MG tablet TAKE ONE TABLET BY MOUTH DAILY Yes Patrice Zuleta MD   citalopram (CELEXA) 20 MG tablet Take 1 tablet by mouth daily Yes Patrice Zuleta MD         Past Medical History:   Diagnosis Date    Anxiety     Hyperlipidemia        Past Surgical History:   Procedure Laterality Date    COLONOSCOPY N/A 2019    COLONOSCOPY performed by Antonia Gray MD at [address] [facility] HAND SURGERY Left 2020    OPEN REDUCTION INTERNAL FIXATION LEFT THUMB CASTAÑEDA'S FRACTURE performed by Lola Lynn MD at [address]         Family History   Problem Relation Age of Onset    High Cholesterol Mother     Other Father         colon polyps    Colon Cancer Father     Dementia Father     Other Sister         gerd       CareTeam (Including outside providers/suppliers regularly involved in providing care):   Patient Care Team:  Patrice Zuleta MD as PCP - General (Family Medicine)  Patrice Zuleta MD as PCP - [facility] Empaneled Provider  Jose Daniel Madera MD as Consulting Physician (Obstetrics & Gynecology)    [facility] Readings from Last 3 Encounters:   20 170 lb (77.1 kg) 09/30/20 160 lb (72.6 kg)   07/28/20 160 lb (72.6 kg)     Vitals:    12/17/20 1112   BP: 120/80   Temp: 96.6 °F (35.9 °C)   Weight: 170 lb (77.1 kg)     Body mass index is 27.03 kg/m². Based upon direct observation of the patient, evaluation of cognition reveals recent and remote memory intact. General Appearance: alert and oriented to person, place and time, well developed and well- nourished, in no acute distress  Skin: warm and dry, no rash or erythema  Head: normocephalic and atraumatic  Eyes: pupils equal, round, and reactive to light, extraocular eye movements intact, conjunctivae normal  ENT: tympanic membrane, external ear and ear canal normal bilaterally, nose without deformity, nasal mucosa and turbinates normal without polyps  Neck: supple and non-tender without mass, no thyromegaly or thyroid nodules, no cervical lymphadenopathy  Pulmonary/Chest: clear to auscultation bilaterally- no wheezes, rales or rhonchi, normal air movement, no respiratory distress  Cardiovascular: normal rate, regular rhythm, normal S1 and S2, no murmurs, rubs, clicks, or gallops, distal pulses intact, no carotid bruits  Extremities: no cyanosis, clubbing or edema  Musculoskeletal: normal range of motion, no joint swelling, deformity or tenderness  Neurologic:  no cranial nerve deficit, gait, coordination and speech normal    Patient's complete Health Risk Assessment and screening values have been reviewed and are found in Flowsheets. The following problems were reviewed today and where indicated follow up appointments were made and/or referrals ordered.     Positive Risk Factor Screenings with Interventions:     Fall Risk:  2 or more falls in past year?: no  Fall with injury in past year?: (!) yes  Fall Risk Interventions:    · Patient declines any further evaluation/treatment for this issue          General Health and ACP:  General  In general, how would you say your health is?: Very Good In the past 7 days, have you experienced any of the following? New or Increased Pain, New or Increased Fatigue, Loneliness, Social Isolation, Stress or Anger?: (!) Social Isolation, Stress  Do you get the social and emotional support that you need?: Yes  Do you have a Living Will?: Yes  Advance Directives     Power of [address] Will ACP-Advance Directive ACP-Power of     Not on File Not on File Not on File Not on File      General Health Risk Interventions:  · Poor self-assessment of health status: patient advised to follow-up in this office for further evaluation and treatment of depression within 6 week(s)    Health Habits/Nutrition:  Health Habits/Nutrition  Do you exercise for at least 20 minutes 2-3 times per week?: Yes  Have you lost any weight without trying in the past 3 months?: No  Do you eat fewer than 2 meals per day?: No  Have you seen a dentist within the past year?: (!) No     Health Habits/Nutrition Interventions:  · Dental exam overdue:  patient encouraged to make appointment with his/her dentist    Hearing/Vision:  No exam data present  Hearing/Vision  Do you or your family notice any trouble with your hearing?: No  Do you have difficulty driving, watching TV, or doing any of your daily activities because of your eyesight?: (!) Yes  Have you had an eye exam within the past year?: Yes  Hearing/Vision Interventions:  · Vision concerns:  patient declines any further evaluation/treatment for this issue Currently in the midst of a work up for double vision after a head injury.     Safety:  Safety  Do you have working smoke detectors?: Yes  Have all throw rugs been removed or fastened?: Yes  Do you have non-slip mats or surfaces in all bathtubs/showers?: Yes  Do all of your stairways have a railing or banister?: (!) No  Are your doorways, halls and stairs free of clutter?: Yes  Do you always fasten your seatbelt when you are in a car?: Yes  Safety Interventions:  · Home safety tips provided Personalized Preventive Plan   Current Health Maintenance Status  Immunization History   Administered Date(s) Administered    DT (pediatric) 11/13/2000    Influenza, High Dose (Fluzone 65 yrs and older) 11/04/2016, 11/13/2017, 10/19/2018, 11/01/2019, 10/13/2020    Pneumococcal Conjugate 13-valent (Rogena Peabody) 11/04/2016    Tdap (Boostrix, Adacel) 01/01/2018    Zoster Live (Zostavax) 01/03/2012    Zoster Recombinant (Shingrix) 05/08/2020        Health Maintenance   Topic Date Due    Hepatitis C screen  1950    Annual Wellness Visit (AWV)  05/29/2019    Lipid screen  12/09/2021    Breast cancer screen  01/27/2022    DTaP/Tdap/Td vaccine (3 - Td) 01/01/2028    Colon cancer screen colonoscopy  04/04/2029    DEXA (modify frequency per FRAX score)  Completed    Flu vaccine  Completed    Shingles Vaccine  Completed    Hepatitis A vaccine  Aged Out    Hepatitis B vaccine  Aged Out    Hib vaccine  Aged Out    Meningococcal (ACWY) vaccine  Aged Out     Recommendations for [employer] Due: see orders and patient instructions/AVS.  . Recommended screening schedule for the next 5-10 years is provided to the patient in written form: see Patient Instructions/AVS.    Keyanna Gomeztos was seen today for medicare awv. Diagnoses and all orders for this visit:    Routine general medical examination at a health care facility    Mixed hyperlipidemia  -     pravastatin (PRAVACHOL) 40 MG tablet; TAKE ONE TABLET BY MOUTH DAILY    Anxiety    Depression, unspecified depression type  -     citalopram (CELEXA) 20 MG tablet;  Take 1 tablet by mouth daily

## 2020-12-18 ENCOUNTER — HOSPITAL ENCOUNTER (OUTPATIENT)
Dept: MRI IMAGING | Age: 70
Discharge: HOME | End: 2020-12-18
Payer: MEDICARE

## 2020-12-18 PROCEDURE — 70551 MRI BRAIN STEM W/O DYE: CPT

## 2020-12-30 RX ORDER — CITALOPRAM 10 MG/1
TABLET ORAL
Qty: 90 TABLET | Refills: 2 | Status: SHIPPED | OUTPATIENT
Start: 2020-12-30 | End: 2021-02-26

## 2021-02-24 ENCOUNTER — IMMUNIZATION (OUTPATIENT)
Dept: PRIMARY CARE CLINIC | Age: 71
End: 2021-02-24
Payer: MEDICARE

## 2021-02-24 PROCEDURE — 91301 COVID-19, MODERNA VACCINE 100MCG/0.5ML DOSE: CPT | Performed by: FAMILY MEDICINE

## 2021-02-24 PROCEDURE — 0011A COVID-19, MODERNA VACCINE 100MCG/0.5ML DOSE: CPT | Performed by: FAMILY MEDICINE

## 2021-02-26 ENCOUNTER — OFFICE VISIT (OUTPATIENT)
Dept: FAMILY MEDICINE CLINIC | Age: 71
End: 2021-02-26
Payer: MEDICARE

## 2021-02-26 VITALS
HEART RATE: 64 BPM | TEMPERATURE: 96.8 F | BODY MASS INDEX: 26.61 KG/M2 | RESPIRATION RATE: 12 BRPM | DIASTOLIC BLOOD PRESSURE: 78 MMHG | SYSTOLIC BLOOD PRESSURE: 110 MMHG | OXYGEN SATURATION: 96 % | WEIGHT: 167.38 LBS

## 2021-02-26 DIAGNOSIS — H02.403 PTOSIS OF BOTH EYELIDS: Primary | ICD-10-CM

## 2021-02-26 DIAGNOSIS — Z01.818 PREOP EXAMINATION: ICD-10-CM

## 2021-02-26 DIAGNOSIS — F41.9 ANXIETY: ICD-10-CM

## 2021-02-26 DIAGNOSIS — E78.2 MIXED HYPERLIPIDEMIA: ICD-10-CM

## 2021-02-26 PROBLEM — S62.212A: Status: RESOLVED | Noted: 2020-07-28 | Resolved: 2021-02-26

## 2021-02-26 PROCEDURE — 99214 OFFICE O/P EST MOD 30 MIN: CPT | Performed by: FAMILY MEDICINE

## 2021-02-26 ASSESSMENT — PATIENT HEALTH QUESTIONNAIRE - PHQ9
SUM OF ALL RESPONSES TO PHQ QUESTIONS 1-9: 0
SUM OF ALL RESPONSES TO PHQ9 QUESTIONS 1 & 2: 0
1. LITTLE INTEREST OR PLEASURE IN DOING THINGS: 0
SUM OF ALL RESPONSES TO PHQ QUESTIONS 1-9: 0
2. FEELING DOWN, DEPRESSED OR HOPELESS: 0

## 2021-02-26 NOTE — PROGRESS NOTES
Preoperative Consultation    Akash Paul  YOB: 1950    This patient presents to the office today for a preoperative consultation at the request of surgeon, Dr. Mathew Russell, who plans on performing Bul Ptosis Repair on  at [facility] surgery [place]. Planned anesthesia: Local and IV sedation   Known anesthesia problems: None   Bleeding risk: No recent or remote history of abnormal bleeding  Personal or FH of DVT/PE: No      Patient Active Problem List   Diagnosis    Anxiety    Hyperlipidemia     Past Surgical History:   Procedure Laterality Date    COLONOSCOPY N/A 2019    COLONOSCOPY performed by Lazaro Chacon MD at [address] [facility] HAND SURGERY Left 2020    OPEN REDUCTION INTERNAL FIXATION LEFT THUMB CASTAÑEDA'S FRACTURE performed by Ge Baez MD at [address]   Allergen Reactions    Pneumococcal Vaccines Other (See Comments)     Large local reaction    Prednisone Palpitations     dizziness     Outpatient Medications Marked as Taking for the 21 encounter (Office Visit) with Soila Jones MD   Medication Sig Dispense Refill    pravastatin (PRAVACHOL) 40 MG tablet TAKE ONE TABLET BY MOUTH DAILY 90 tablet 3    citalopram (CELEXA) 20 MG tablet Take 1 tablet by mouth daily 90 tablet 1       Social History     Tobacco Use    Smoking status: Former Smoker     Packs/day: 1.00     Years: 10.00     Pack years: 10.00     Quit date: 1983     Years since quittin.2    Smokeless tobacco: Never Used   Substance Use Topics    Alcohol use: Yes     Alcohol/week: 0.0 standard drinks     Comment: 0-4 glasses of wine per month     Family History   Problem Relation Age of Onset    High Cholesterol Mother     Other Father         colon polyps    Colon Cancer Father     Dementia Father     Other Sister         gerd       Review of Systems  A comprehensive review of systems was negative except for what was noted in the HPI.      Physical Exam /78 (Site: Right Upper Arm, Position: Sitting, Cuff Size: Medium Adult)   Pulse 64   Temp 96.8 °F (36 °C)   Resp 12   Wt 167 lb 6 oz (75.9 kg)   SpO2 96%   BMI 26.61 kg/m²   Weight: 167 lb 6 oz (75.9 kg)   Constitutional: She is oriented to person, place, and time. She appears well-developed and well-nourished. No distress. HENT:   Head: Normocephalic and atraumatic. Mouth/Throat: Uvula is midline, oropharynx is clear and moist and mucous membranes are normal.   Eyes: Conjunctivae and EOM are normal. Pupils are equal, round, and reactive to light. R>L upper eyelid ptosis  Neck: Trachea normal and normal range of motion. Neck supple. No JVD present. Carotid bruit is not present. No mass and no thyromegaly present. Cardiovascular: Normal rate, regular rhythm, normal heart sounds and intact distal pulses. Exam reveals no gallop and no friction rub. No murmur heard. Pulmonary/Chest: Effort normal and breath sounds normal. No respiratory distress. She has no wheezes. She has no rales. Abdominal: Soft. Normal aorta and bowel sounds are normal. She exhibits no distension and no mass. There is no hepatosplenomegaly. No tenderness. Musculoskeletal: She exhibits no edema and no tenderness. Neurological: She is alert and oriented to person, place, and time. She has normal strength. No cranial nerve deficit or sensory deficit. Coordination and gait normal.   Skin: Skin is warm and dry. No rash noted. No erythema. Lab Review No       Assessment:       Sasha Dixon was seen today for pre-op exam.    Diagnoses and all orders for this visit:    Ptosis of both eyelids    Preop examination    Mixed hyperlipidemia    Anxiety      80 y.o. patient  approved for Surgery         Plan:     1. Preoperative workup as follows: none  2. Change in medication regimen before surgery: None  3. No contraindications to planned surgery  4. Medical decision making of moderate complexity.

## 2021-03-24 ENCOUNTER — IMMUNIZATION (OUTPATIENT)
Dept: PRIMARY CARE CLINIC | Age: 71
End: 2021-03-24
Payer: MEDICARE

## 2021-03-24 PROCEDURE — 0012A COVID-19, MODERNA VACCINE 100MCG/0.5ML DOSE: CPT | Performed by: FAMILY MEDICINE

## 2021-03-24 PROCEDURE — 91301 COVID-19, MODERNA VACCINE 100MCG/0.5ML DOSE: CPT | Performed by: FAMILY MEDICINE

## 2021-06-12 DIAGNOSIS — F32.A DEPRESSION, UNSPECIFIED DEPRESSION TYPE: ICD-10-CM

## 2021-06-14 ENCOUNTER — HOSPITAL ENCOUNTER (OUTPATIENT)
Age: 71
Discharge: HOME | End: 2021-06-14
Payer: MEDICARE

## 2021-06-14 DIAGNOSIS — E78.2 MIXED HYPERLIPIDEMIA: ICD-10-CM

## 2021-06-14 LAB
A/G RATIO: 1.7 (ref 1.1–2.2)
ALBUMIN SERPL-MCNC: 4.4 G/DL (ref 3.4–5)
ALP BLD-CCNC: 62 U/L (ref 40–129)
ALT SERPL-CCNC: 15 U/L (ref 10–40)
ANION GAP SERPL CALCULATED.3IONS-SCNC: 11 MMOL/L (ref 3–16)
AST SERPL-CCNC: 19 U/L (ref 15–37)
BASOPHILS ABSOLUTE: 0 K/UL (ref 0–0.2)
BASOPHILS RELATIVE PERCENT: 0.6 %
BILIRUB SERPL-MCNC: 0.3 MG/DL (ref 0–1)
BUN BLDV-MCNC: 17 MG/DL (ref 7–20)
CALCIUM SERPL-MCNC: 8.8 MG/DL (ref 8.3–10.6)
CHLORIDE BLD-SCNC: 103 MMOL/L (ref 99–110)
CHOLESTEROL, FASTING: 194 MG/DL (ref 0–199)
CO2: 25 MMOL/L (ref 21–32)
CREAT SERPL-MCNC: 0.7 MG/DL (ref 0.6–1.2)
EOSINOPHILS ABSOLUTE: 0.1 K/UL (ref 0–0.6)
EOSINOPHILS RELATIVE PERCENT: 2.2 %
GFR AFRICAN AMERICAN: >60
GFR NON-AFRICAN AMERICAN: >60
GLOBULIN: 2.6 G/DL
GLUCOSE FASTING: 83 MG/DL (ref 70–99)
HCT VFR BLD CALC: 40.5 % (ref 36–48)
HDLC SERPL-MCNC: 59 MG/DL (ref 40–60)
HEMOGLOBIN: 13.8 G/DL (ref 12–16)
LDL CHOLESTEROL CALCULATED: 111 MG/DL
LYMPHOCYTES ABSOLUTE: 1.8 K/UL (ref 1–5.1)
LYMPHOCYTES RELATIVE PERCENT: 33.5 %
MCH RBC QN AUTO: 31.3 PG (ref 26–34)
MCHC RBC AUTO-ENTMCNC: 34.1 G/DL (ref 31–36)
MCV RBC AUTO: 91.8 FL (ref 80–100)
MONOCYTES ABSOLUTE: 0.4 K/UL (ref 0–1.3)
MONOCYTES RELATIVE PERCENT: 6.7 %
NEUTROPHILS ABSOLUTE: 3 K/UL (ref 1.7–7.7)
NEUTROPHILS RELATIVE PERCENT: 57 %
PDW BLD-RTO: 13.3 % (ref 12.4–15.4)
PLATELET # BLD: 279 K/UL (ref 135–450)
PMV BLD AUTO: 8.1 FL (ref 5–10.5)
POTASSIUM SERPL-SCNC: 4.8 MMOL/L (ref 3.5–5.1)
RBC # BLD: 4.41 M/UL (ref 4–5.2)
SODIUM BLD-SCNC: 139 MMOL/L (ref 136–145)
TOTAL PROTEIN: 7 G/DL (ref 6.4–8.2)
TRIGLYCERIDE, FASTING: 121 MG/DL (ref 0–150)
VLDLC SERPL CALC-MCNC: 24 MG/DL
WBC # BLD: 5.3 K/UL (ref 4–11)

## 2021-06-14 PROCEDURE — 85025 COMPLETE CBC W/AUTO DIFF WBC: CPT

## 2021-06-14 PROCEDURE — 80053 COMPREHEN METABOLIC PANEL: CPT

## 2021-06-14 PROCEDURE — 80061 LIPID PANEL: CPT

## 2021-06-14 PROCEDURE — 36415 COLL VENOUS BLD VENIPUNCTURE: CPT

## 2021-06-15 RX ORDER — CITALOPRAM 20 MG/1
TABLET ORAL
Qty: 90 TABLET | Refills: 0 | Status: SHIPPED | OUTPATIENT
Start: 2021-06-15 | End: 2021-09-13

## 2021-06-17 ENCOUNTER — OFFICE VISIT (OUTPATIENT)
Dept: FAMILY MEDICINE CLINIC | Age: 71
End: 2021-06-17
Payer: MEDICARE

## 2021-06-17 VITALS
TEMPERATURE: 96.9 F | DIASTOLIC BLOOD PRESSURE: 80 MMHG | WEIGHT: 163 LBS | SYSTOLIC BLOOD PRESSURE: 120 MMHG | BODY MASS INDEX: 25.92 KG/M2

## 2021-06-17 DIAGNOSIS — F41.9 ANXIETY: Primary | ICD-10-CM

## 2021-06-17 DIAGNOSIS — E78.2 MIXED HYPERLIPIDEMIA: ICD-10-CM

## 2021-06-17 PROCEDURE — 99214 OFFICE O/P EST MOD 30 MIN: CPT | Performed by: FAMILY MEDICINE

## 2021-06-17 ASSESSMENT — ENCOUNTER SYMPTOMS
BACK PAIN: 1
DIARRHEA: 0
SHORTNESS OF BREATH: 0
CONSTIPATION: 1
EYE ITCHING: 0
WHEEZING: 0
RHINORRHEA: 0
CHEST TIGHTNESS: 0

## 2021-06-17 NOTE — PROGRESS NOTES
effort is normal. No respiratory distress. Breath sounds: Normal breath sounds. No rales. Musculoskeletal:      Cervical back: Normal range of motion and neck supple. Lymphadenopathy:      Cervical: No cervical adenopathy. Skin:     General: Skin is warm and dry. Neurological:      Mental Status: She is alert and oriented to person, place, and time. ASSESSMENT/PLAN:    Elkinsgeronimo Ann was seen today for follow-up. Diagnoses and all orders for this visit:    Anxiety  Stable on current dosage of citalopram    Mixed hyperlipidemia  LDL is slightly elevated at 111 but will continue her current dose of Pravachol. She is encouraged to exercise more. -     Comprehensive Metabolic Panel, Fasting; Future  -     Lipid, Fasting; Future        Return in about 6 months (around 12/17/2021). Please note portions of this note were completed with a voicerecognition program.  Efforts were made to edit the dictations but occasionally words are mis-transcribed.

## 2021-09-11 DIAGNOSIS — F32.A DEPRESSION, UNSPECIFIED DEPRESSION TYPE: ICD-10-CM

## 2021-09-13 RX ORDER — CITALOPRAM 20 MG/1
TABLET ORAL
Qty: 90 TABLET | Refills: 0 | Status: SHIPPED | OUTPATIENT
Start: 2021-09-13 | End: 2021-12-10

## 2021-12-10 DIAGNOSIS — F32.A DEPRESSION, UNSPECIFIED DEPRESSION TYPE: ICD-10-CM

## 2021-12-10 DIAGNOSIS — E78.2 MIXED HYPERLIPIDEMIA: ICD-10-CM

## 2021-12-10 RX ORDER — CITALOPRAM 20 MG/1
TABLET ORAL
Qty: 90 TABLET | Refills: 0 | Status: SHIPPED | OUTPATIENT
Start: 2021-12-10 | End: 2021-12-17 | Stop reason: SDUPTHER

## 2021-12-10 RX ORDER — PRAVASTATIN SODIUM 40 MG
TABLET ORAL
Qty: 90 TABLET | Refills: 0 | Status: SHIPPED | OUTPATIENT
Start: 2021-12-10 | End: 2021-12-17 | Stop reason: SDUPTHER

## 2021-12-13 ENCOUNTER — HOSPITAL ENCOUNTER (OUTPATIENT)
Age: 71
Discharge: HOME | End: 2021-12-13
Payer: MEDICARE

## 2021-12-13 DIAGNOSIS — E78.2 MIXED HYPERLIPIDEMIA: ICD-10-CM

## 2021-12-13 LAB
A/G RATIO: 1.8 (ref 1.1–2.2)
ALBUMIN SERPL-MCNC: 4.4 G/DL (ref 3.4–5)
ALP BLD-CCNC: 66 U/L (ref 40–129)
ALT SERPL-CCNC: 12 U/L (ref 10–40)
ANION GAP SERPL CALCULATED.3IONS-SCNC: 11 MMOL/L (ref 3–16)
AST SERPL-CCNC: 14 U/L (ref 15–37)
BILIRUB SERPL-MCNC: 0.4 MG/DL (ref 0–1)
BUN BLDV-MCNC: 13 MG/DL (ref 7–20)
CALCIUM SERPL-MCNC: 9.2 MG/DL (ref 8.3–10.6)
CHLORIDE BLD-SCNC: 107 MMOL/L (ref 99–110)
CHOLESTEROL, FASTING: 185 MG/DL (ref 0–199)
CO2: 24 MMOL/L (ref 21–32)
CREAT SERPL-MCNC: 0.6 MG/DL (ref 0.6–1.2)
GFR AFRICAN AMERICAN: >60
GFR NON-AFRICAN AMERICAN: >60
GLUCOSE FASTING: 82 MG/DL (ref 70–99)
HDLC SERPL-MCNC: 58 MG/DL (ref 40–60)
LDL CHOLESTEROL CALCULATED: 93 MG/DL
POTASSIUM SERPL-SCNC: 5.4 MMOL/L (ref 3.5–5.1)
SODIUM BLD-SCNC: 142 MMOL/L (ref 136–145)
TOTAL PROTEIN: 6.8 G/DL (ref 6.4–8.2)
TRIGLYCERIDE, FASTING: 168 MG/DL (ref 0–150)
VLDLC SERPL CALC-MCNC: 34 MG/DL

## 2021-12-13 PROCEDURE — 80061 LIPID PANEL: CPT

## 2021-12-13 PROCEDURE — 36415 COLL VENOUS BLD VENIPUNCTURE: CPT

## 2021-12-13 PROCEDURE — 80053 COMPREHEN METABOLIC PANEL: CPT

## 2021-12-17 ENCOUNTER — OFFICE VISIT (OUTPATIENT)
Dept: FAMILY MEDICINE CLINIC | Age: 71
End: 2021-12-17
Payer: MEDICARE

## 2021-12-17 VITALS
TEMPERATURE: 97.3 F | WEIGHT: 175 LBS | BODY MASS INDEX: 27.47 KG/M2 | SYSTOLIC BLOOD PRESSURE: 118 MMHG | DIASTOLIC BLOOD PRESSURE: 80 MMHG | HEIGHT: 67 IN

## 2021-12-17 DIAGNOSIS — E87.5 HYPERKALEMIA: ICD-10-CM

## 2021-12-17 DIAGNOSIS — E78.2 MIXED HYPERLIPIDEMIA: ICD-10-CM

## 2021-12-17 DIAGNOSIS — F32.A DEPRESSION, UNSPECIFIED DEPRESSION TYPE: ICD-10-CM

## 2021-12-17 DIAGNOSIS — F41.9 ANXIETY: Primary | ICD-10-CM

## 2021-12-17 LAB — POTASSIUM SERPL-SCNC: 4.7 MMOL/L (ref 3.5–5.1)

## 2021-12-17 PROCEDURE — 99214 OFFICE O/P EST MOD 30 MIN: CPT | Performed by: FAMILY MEDICINE

## 2021-12-17 PROCEDURE — 3288F FALL RISK ASSESSMENT DOCD: CPT | Performed by: FAMILY MEDICINE

## 2021-12-17 RX ORDER — CITALOPRAM 20 MG/1
TABLET ORAL
Qty: 90 TABLET | Refills: 3 | Status: SHIPPED | OUTPATIENT
Start: 2021-12-17

## 2021-12-17 RX ORDER — PRAVASTATIN SODIUM 40 MG
TABLET ORAL
Qty: 90 TABLET | Refills: 3 | Status: SHIPPED | OUTPATIENT
Start: 2021-12-17

## 2021-12-17 ASSESSMENT — ENCOUNTER SYMPTOMS
SHORTNESS OF BREATH: 0
BACK PAIN: 1
CHEST TIGHTNESS: 0
CONSTIPATION: 1
RHINORRHEA: 0
DIARRHEA: 0
NAUSEA: 0

## 2021-12-17 NOTE — PROGRESS NOTES
SUBJECTIVE:    Connie Allison is a 80 y.o. female who presents for a follow up visit. Chief Complaint   Patient presents with    Follow-up     Patient is here for a follow up. No new concerns. Discuss lab. Hyperlipidemia  This is a chronic problem. The current episode started more than 1 year ago. Lipid results: Total cholesterol 185, triglycerides 168, HDL 58, LDL 93. Pertinent negatives include no chest pain or shortness of breath. Current antihyperlipidemic treatment includes statins. The current treatment provides significant improvement of lipids. Compliance problems include adherence to exercise and adherence to diet. Risk factors for coronary artery disease include dyslipidemia, a sedentary lifestyle and post-menopausal.   Mental Health Problem  The primary symptoms do not include dysphoric mood. Primary symptoms comment: anxiety. This is a chronic problem. The onset of the illness is precipitated by emotional stress. Additional symptoms of the illness do not include fatigue. She does not admit to suicidal ideas. She does not contemplate harming herself. Patient's medications, allergies, past medical,surgical, social and family histories were reviewed and updated as appropriate.      Past Medical History:   Diagnosis Date    Anxiety     Hyperlipidemia      Past Surgical History:   Procedure Laterality Date    COLONOSCOPY N/A 4/4/2019    COLONOSCOPY performed by Wilner Yeung MD at [address] [facility] HAND SURGERY Left 7/28/2020    OPEN REDUCTION INTERNAL FIXATION LEFT THUMB CASTAÑEDA'S FRACTURE performed by Mani Chowdhury MD at [address]     Family History   Problem Relation Age of Onset    High Cholesterol Mother     Other Father         colon polyps    Colon Cancer Father     Dementia Father     Other Sister         gerd     Social History     Tobacco Use    Smoking status: Former Smoker     Packs/day: 1.00     Years: 10.00     Pack years: 10.00     Quit date: 12/16/1983 Years since quittin.0    Smokeless tobacco: Never Used   Substance Use Topics    Alcohol use: Yes     Alcohol/week: 0.0 standard drinks     Comment: 0-4 glasses of wine per month      Allergies   Allergen Reactions    Pneumococcal Vaccines Other (See Comments)     Large local reaction    Prednisone Palpitations     dizziness     No current outpatient medications on file prior to visit. No current facility-administered medications on file prior to visit. Review of Systems   Constitutional: Negative for activity change and fatigue. HENT: Negative for congestion, postnasal drip and rhinorrhea. Respiratory: Negative for chest tightness and shortness of breath. Cardiovascular: Negative for chest pain and palpitations. Gastrointestinal: Positive for constipation ( using stool softners). Negative for diarrhea and nausea. Genitourinary: Negative for difficulty urinating. Musculoskeletal: Positive for back pain. Neurological: Negative for dizziness and light-headedness. Psychiatric/Behavioral: Negative for dysphoric mood and sleep disturbance. The patient is not nervous/anxious. OBJECTIVE:    /80   Temp 97.3 °F (36.3 °C)   Ht 5' 6.5\" (1.689 m)   Wt 175 lb (79.4 kg)   BMI 27.82 kg/m²    Physical Exam  Constitutional:       Appearance: She is well-developed. HENT:      Head: Normocephalic and atraumatic. Right Ear: External ear normal.      Left Ear: External ear normal.      Nose: Nose normal.   Eyes:      General:         Right eye: No discharge. Conjunctiva/sclera: Conjunctivae normal.   Neck:      Thyroid: No thyromegaly. Vascular: No JVD. Trachea: No tracheal deviation. Cardiovascular:      Rate and Rhythm: Normal rate and regular rhythm. Heart sounds: Normal heart sounds. Pulmonary:      Effort: Pulmonary effort is normal. No respiratory distress. Breath sounds: Normal breath sounds. No rales.    Musculoskeletal:      Cervical back: Normal range of motion and neck supple. Right lower leg: No edema. Left lower leg: No edema. Lymphadenopathy:      Cervical: No cervical adenopathy. Skin:     General: Skin is warm and dry. Neurological:      Mental Status: She is alert and oriented to person, place, and time. Psychiatric:         Mood and Affect: Mood normal.         Behavior: Behavior normal.         ASSESSMENT/PLAN:    Caitlin Manley was seen today for follow-up. Diagnoses and all orders for this visit:    Anxiety  Good control on Celexa    Mixed hyperlipidemia  LDL is at goal of less than 100. Triglycerides elevated. Suggested low carbohydrate diet and increased exercise. -     pravastatin (PRAVACHOL) 40 MG tablet; One daily  -     Comprehensive Metabolic Panel, Fasting; Future  -     CBC Auto Differential; Future  -     Lipid, Fasting; Future  -     TSH with Reflex; Future    Depression, unspecified depression type  -     citalopram (CELEXA) 20 MG tablet; TAKE ONE TABLET BY MOUTH DAILY    Hyperkalemia  Patient is on no potassium sparing meds and has normal renal function. This is most likely from hemolysis of the specimen. Will obtain a repeat potassium today  -     Potassium; Future        Return in about 1 year (around 12/17/2022). Please note portions of this note were completed with a voicerecognition program.  Efforts were made to edit the dictations but occasionally words are mis-transcribed.

## 2022-12-12 ENCOUNTER — OFFICE VISIT (OUTPATIENT)
Dept: FAMILY MEDICINE CLINIC | Age: 72
End: 2022-12-12
Payer: MEDICARE

## 2022-12-12 VITALS — TEMPERATURE: 99.1 F | OXYGEN SATURATION: 99 % | HEART RATE: 88 BPM

## 2022-12-12 DIAGNOSIS — U07.1 COVID: Primary | ICD-10-CM

## 2022-12-12 PROCEDURE — 99213 OFFICE O/P EST LOW 20 MIN: CPT | Performed by: FAMILY MEDICINE

## 2022-12-12 PROCEDURE — 1123F ACP DISCUSS/DSCN MKR DOCD: CPT | Performed by: FAMILY MEDICINE

## 2022-12-12 RX ORDER — NIRMATRELVIR AND RITONAVIR 300-100 MG
KIT ORAL
Qty: 30 TABLET | Refills: 0 | Status: SHIPPED | OUTPATIENT
Start: 2022-12-12

## 2022-12-12 NOTE — PROGRESS NOTES
Subjective:      Patient ID: Vipin Looney is a 71 y.o. female. HPI patient presents stating for 2 last 2 days she has had body aches cough and congestion. She denies any loss of taste or smell. She denies GI symptoms. She did 2 home COVID test which were positive and she is wanting to have a confirmatory test.    Review of Systems  Allergies   Allergen Reactions    Pneumococcal Vaccines Other (See Comments)     Large local reaction    Prednisone Palpitations     dizziness     Vitals:    12/12/22 1538   Pulse: 88   Temp: 99.1 °F (37.3 °C)   SpO2: 99%       Objective:   Physical Exam  Vitals reviewed. Constitutional:       General: She is not in acute distress. Appearance: She is well-developed. HENT:      Right Ear: Tympanic membrane normal.      Left Ear: Tympanic membrane normal.      Nose: Mucosal edema, congestion and rhinorrhea present. Right Sinus: No maxillary sinus tenderness or frontal sinus tenderness. Left Sinus: No maxillary sinus tenderness or frontal sinus tenderness. Pulmonary:      Effort: Pulmonary effort is normal. No respiratory distress. Breath sounds: Normal breath sounds. Musculoskeletal:      Cervical back: Neck supple. Lymphadenopathy:      Cervical: No cervical adenopathy. Neurological:      Mental Status: She is alert. Psychiatric:         Behavior: Behavior is cooperative. Assessment:      Naga Severino was seen today for positive for covid-19. Diagnoses and all orders for this visit:    COVID  -     COVID-19    Other orders  -     nirmatrelvir/ritonavir (PAXLOVID, 300/100,) 20 x 150 MG & 10 x 100MG TBPK; Take 3 tablets (two 150 mg nirmatrelvir and one 100 mg ritonavir tablets) by mouth every 12 hours for 5 days.   -     COVID-19  -     COVID-19  -     COVID-19          Plan:      Informational handout provided  Patient was started on antiviral medications  RTC PRN    Medical decision making of low complexity          Thais Allen MD

## 2022-12-13 LAB — SARS-COV-2: DETECTED

## 2023-01-19 ENCOUNTER — TELEPHONE (OUTPATIENT)
Dept: FAMILY MEDICINE CLINIC | Age: 73
End: 2023-01-19

## 2023-01-19 DIAGNOSIS — E78.2 MIXED HYPERLIPIDEMIA: Primary | ICD-10-CM

## 2023-01-19 NOTE — TELEPHONE ENCOUNTER
----- Message from [employer] [facility] [employer] sent at 1/19/2023 11:16 AM EST -----  Subject: Referral Request    Reason for referral request? [facility]  Provider patient wants to be referred to(if known):     Provider Phone Number(if known): Additional Information for Provider? Patient would like to get the blood   tests needed for the AWV. Patient would like to go in on Monday morning   01/23 for the bloodwork.  Please call patient when orders are in.  ---------------------------------------------------------------------------  --------------  [address] [employer]    [phone]; OK to leave message on voicemail  ---------------------------------------------------------------------------  --------------

## 2023-01-19 NOTE — TELEPHONE ENCOUNTER
----- Message from [employer] [facility] [employer] sent at 1/19/2023 11:16 AM EST -----  Subject: Referral Request    Reason for referral request? [facility]  Provider patient wants to be referred to(if known):     Provider Phone Number(if known): Additional Information for Provider? Patient would like to get the blood   tests needed for the AWV. Patient would like to go in on Monday morning   01/23 for the bloodwork.  Please call patient when orders are in.  ---------------------------------------------------------------------------  --------------  [address] [employer]    [phone]; OK to leave message on voicemail  ---------------------------------------------------------------------------  --------------

## 2023-01-23 DIAGNOSIS — E78.2 MIXED HYPERLIPIDEMIA: ICD-10-CM

## 2023-01-23 LAB
A/G RATIO: 2 (ref 1.1–2.2)
ALBUMIN SERPL-MCNC: 4.4 G/DL (ref 3.4–5)
ALP BLD-CCNC: 63 U/L (ref 40–129)
ALT SERPL-CCNC: 12 U/L (ref 10–40)
ANION GAP SERPL CALCULATED.3IONS-SCNC: 16 MMOL/L (ref 3–16)
AST SERPL-CCNC: 16 U/L (ref 15–37)
BASOPHILS ABSOLUTE: 0 K/UL (ref 0–0.2)
BASOPHILS RELATIVE PERCENT: 0.5 %
BILIRUB SERPL-MCNC: 0.6 MG/DL (ref 0–1)
BUN BLDV-MCNC: 16 MG/DL (ref 7–20)
CALCIUM SERPL-MCNC: 9.1 MG/DL (ref 8.3–10.6)
CHLORIDE BLD-SCNC: 105 MMOL/L (ref 99–110)
CHOLESTEROL, FASTING: 193 MG/DL (ref 0–199)
CO2: 22 MMOL/L (ref 21–32)
CREAT SERPL-MCNC: 0.7 MG/DL (ref 0.6–1.2)
EOSINOPHILS ABSOLUTE: 0.2 K/UL (ref 0–0.6)
EOSINOPHILS RELATIVE PERCENT: 3.6 %
GFR SERPL CREATININE-BSD FRML MDRD: >60 ML/MIN/{1.73_M2}
GLUCOSE BLD-MCNC: 94 MG/DL (ref 70–99)
HCT VFR BLD CALC: 39.7 % (ref 36–48)
HDLC SERPL-MCNC: 61 MG/DL (ref 40–60)
HEMOGLOBIN: 13.2 G/DL (ref 12–16)
LDL CHOLESTEROL CALCULATED: 106 MG/DL
LYMPHOCYTES ABSOLUTE: 1.9 K/UL (ref 1–5.1)
LYMPHOCYTES RELATIVE PERCENT: 38.1 %
MCH RBC QN AUTO: 31.4 PG (ref 26–34)
MCHC RBC AUTO-ENTMCNC: 33.4 G/DL (ref 31–36)
MCV RBC AUTO: 94.1 FL (ref 80–100)
MONOCYTES ABSOLUTE: 0.3 K/UL (ref 0–1.3)
MONOCYTES RELATIVE PERCENT: 6.6 %
NEUTROPHILS ABSOLUTE: 2.6 K/UL (ref 1.7–7.7)
NEUTROPHILS RELATIVE PERCENT: 51.2 %
PDW BLD-RTO: 13.9 % (ref 12.4–15.4)
PLATELET # BLD: 280 K/UL (ref 135–450)
PMV BLD AUTO: 8.5 FL (ref 5–10.5)
POTASSIUM SERPL-SCNC: 4.4 MMOL/L (ref 3.5–5.1)
RBC # BLD: 4.22 M/UL (ref 4–5.2)
SODIUM BLD-SCNC: 143 MMOL/L (ref 136–145)
TOTAL PROTEIN: 6.6 G/DL (ref 6.4–8.2)
TRIGLYCERIDE, FASTING: 132 MG/DL (ref 0–150)
TSH REFLEX: 2.22 UIU/ML (ref 0.27–4.2)
VLDLC SERPL CALC-MCNC: 26 MG/DL
WBC # BLD: 5.1 K/UL (ref 4–11)

## 2023-01-24 SDOH — HEALTH STABILITY: PHYSICAL HEALTH: ON AVERAGE, HOW MANY DAYS PER WEEK DO YOU ENGAGE IN MODERATE TO STRENUOUS EXERCISE (LIKE A BRISK WALK)?: 3 DAYS

## 2023-01-24 SDOH — HEALTH STABILITY: PHYSICAL HEALTH: ON AVERAGE, HOW MANY MINUTES DO YOU ENGAGE IN EXERCISE AT THIS LEVEL?: 40 MIN

## 2023-01-24 ASSESSMENT — PATIENT HEALTH QUESTIONNAIRE - PHQ9
SUM OF ALL RESPONSES TO PHQ QUESTIONS 1-9: 0
SUM OF ALL RESPONSES TO PHQ QUESTIONS 1-9: 0
SUM OF ALL RESPONSES TO PHQ9 QUESTIONS 1 & 2: 0
SUM OF ALL RESPONSES TO PHQ QUESTIONS 1-9: 0
SUM OF ALL RESPONSES TO PHQ QUESTIONS 1-9: 0
2. FEELING DOWN, DEPRESSED OR HOPELESS: 0
1. LITTLE INTEREST OR PLEASURE IN DOING THINGS: 0

## 2023-01-24 ASSESSMENT — LIFESTYLE VARIABLES
HOW OFTEN DO YOU HAVE SIX OR MORE DRINKS ON ONE OCCASION: 2
HOW OFTEN DURING THE LAST YEAR HAVE YOU HAD A FEELING OF GUILT OR REMORSE AFTER DRINKING: 0
HOW MANY STANDARD DRINKS CONTAINING ALCOHOL DO YOU HAVE ON A TYPICAL DAY: 1
HOW OFTEN DURING THE LAST YEAR HAVE YOU HAD A FEELING OF GUILT OR REMORSE AFTER DRINKING: NEVER
HAVE YOU OR SOMEONE ELSE BEEN INJURED AS A RESULT OF YOUR DRINKING: NO
HOW OFTEN DURING THE LAST YEAR HAVE YOU FOUND THAT YOU WERE NOT ABLE TO STOP DRINKING ONCE YOU HAD STARTED: NEVER
HOW OFTEN DO YOU HAVE A DRINK CONTAINING ALCOHOL: 3
HAVE YOU OR SOMEONE ELSE BEEN INJURED AS A RESULT OF YOUR DRINKING: 0
HOW MANY STANDARD DRINKS CONTAINING ALCOHOL DO YOU HAVE ON A TYPICAL DAY: 1 OR 2
HOW OFTEN DURING THE LAST YEAR HAVE YOU FAILED TO DO WHAT WAS NORMALLY EXPECTED FROM YOU BECAUSE OF DRINKING: NEVER
HOW OFTEN DURING THE LAST YEAR HAVE YOU NEEDED AN ALCOHOLIC DRINK FIRST THING IN THE MORNING TO GET YOURSELF GOING AFTER A NIGHT OF HEAVY DRINKING: NEVER
HOW OFTEN DO YOU HAVE A DRINK CONTAINING ALCOHOL: 2-4 TIMES A MONTH
HOW OFTEN DURING THE LAST YEAR HAVE YOU NEEDED AN ALCOHOLIC DRINK FIRST THING IN THE MORNING TO GET YOURSELF GOING AFTER A NIGHT OF HEAVY DRINKING: 0
HOW OFTEN DURING THE LAST YEAR HAVE YOU BEEN UNABLE TO REMEMBER WHAT HAPPENED THE NIGHT BEFORE BECAUSE YOU HAD BEEN DRINKING: 0
HAS A RELATIVE, FRIEND, DOCTOR, OR ANOTHER HEALTH PROFESSIONAL EXPRESSED CONCERN ABOUT YOUR DRINKING OR SUGGESTED YOU CUT DOWN: NO
HOW OFTEN DURING THE LAST YEAR HAVE YOU FAILED TO DO WHAT WAS NORMALLY EXPECTED FROM YOU BECAUSE OF DRINKING: 0
HAS A RELATIVE, FRIEND, DOCTOR, OR ANOTHER HEALTH PROFESSIONAL EXPRESSED CONCERN ABOUT YOUR DRINKING OR SUGGESTED YOU CUT DOWN: 0
HOW OFTEN DURING THE LAST YEAR HAVE YOU BEEN UNABLE TO REMEMBER WHAT HAPPENED THE NIGHT BEFORE BECAUSE YOU HAD BEEN DRINKING: NEVER
HOW OFTEN DURING THE LAST YEAR HAVE YOU FOUND THAT YOU WERE NOT ABLE TO STOP DRINKING ONCE YOU HAD STARTED: 0

## 2023-01-27 ENCOUNTER — OFFICE VISIT (OUTPATIENT)
Dept: FAMILY MEDICINE CLINIC | Age: 73
End: 2023-01-27

## 2023-01-27 VITALS
DIASTOLIC BLOOD PRESSURE: 80 MMHG | WEIGHT: 176 LBS | BODY MASS INDEX: 27.62 KG/M2 | HEIGHT: 67 IN | SYSTOLIC BLOOD PRESSURE: 116 MMHG | TEMPERATURE: 98.1 F

## 2023-01-27 DIAGNOSIS — Z12.31 ENCOUNTER FOR SCREENING MAMMOGRAM FOR MALIGNANT NEOPLASM OF BREAST: Primary | ICD-10-CM

## 2023-01-27 DIAGNOSIS — E78.2 MIXED HYPERLIPIDEMIA: ICD-10-CM

## 2023-01-27 DIAGNOSIS — Z00.00 MEDICARE ANNUAL WELLNESS VISIT, SUBSEQUENT: ICD-10-CM

## 2023-01-27 DIAGNOSIS — F32.A DEPRESSION, UNSPECIFIED DEPRESSION TYPE: ICD-10-CM

## 2023-01-27 LAB — MAMMOGRAPHY, EXTERNAL: NORMAL

## 2023-01-27 RX ORDER — PRAVASTATIN SODIUM 40 MG
TABLET ORAL
Qty: 90 TABLET | Refills: 3 | Status: SHIPPED | OUTPATIENT
Start: 2023-01-27

## 2023-01-27 RX ORDER — CITALOPRAM 20 MG/1
TABLET ORAL
Qty: 90 TABLET | Refills: 3 | Status: SHIPPED | OUTPATIENT
Start: 2023-01-27

## 2023-01-27 ASSESSMENT — PATIENT HEALTH QUESTIONNAIRE - PHQ9
7. TROUBLE CONCENTRATING ON THINGS, SUCH AS READING THE NEWSPAPER OR WATCHING TELEVISION: 0
4. FEELING TIRED OR HAVING LITTLE ENERGY: 0
SUM OF ALL RESPONSES TO PHQ QUESTIONS 1-9: 0
2. FEELING DOWN, DEPRESSED OR HOPELESS: 0
5. POOR APPETITE OR OVEREATING: 0
9. THOUGHTS THAT YOU WOULD BE BETTER OFF DEAD, OR OF HURTING YOURSELF: 0
1. LITTLE INTEREST OR PLEASURE IN DOING THINGS: 0
SUM OF ALL RESPONSES TO PHQ9 QUESTIONS 1 & 2: 0
6. FEELING BAD ABOUT YOURSELF - OR THAT YOU ARE A FAILURE OR HAVE LET YOURSELF OR YOUR FAMILY DOWN: 0
8. MOVING OR SPEAKING SO SLOWLY THAT OTHER PEOPLE COULD HAVE NOTICED. OR THE OPPOSITE, BEING SO FIGETY OR RESTLESS THAT YOU HAVE BEEN MOVING AROUND A LOT MORE THAN USUAL: 0
SUM OF ALL RESPONSES TO PHQ QUESTIONS 1-9: 0
SUM OF ALL RESPONSES TO PHQ QUESTIONS 1-9: 0
3. TROUBLE FALLING OR STAYING ASLEEP: 0
SUM OF ALL RESPONSES TO PHQ QUESTIONS 1-9: 0

## 2023-01-27 ASSESSMENT — ENCOUNTER SYMPTOMS
RHINORRHEA: 0
SHORTNESS OF BREATH: 0
CONSTIPATION: 1
BACK PAIN: 1
DIARRHEA: 0

## 2023-01-27 NOTE — PROGRESS NOTES
SUBJECTIVE:    Anel Kraus is a 71 y.o. female who presents for a follow up visit. Chief Complaint   Patient presents with    Medicare AWV     AWV, discuss lab. Hyperlipidemia  This is a chronic problem. The current episode started more than 1 year ago. Lipid results: Total cholesterol 193, triglycerides 132, HDL 61, . Pertinent negatives include no chest pain or shortness of breath. Current antihyperlipidemic treatment includes statins. The current treatment provides significant improvement of lipids. Compliance problems include adherence to exercise and adherence to diet. Risk factors for coronary artery disease include dyslipidemia, post-menopausal and a sedentary lifestyle. Mental Health Problem  The primary symptoms do not include dysphoric mood. This is a chronic problem. The onset of the illness is precipitated by emotional stress. The degree of incapacity that she is experiencing as a consequence of her illness is mild. Additional symptoms of the illness do not include insomnia, appetite change, fatigue or feelings of worthlessness. She does not admit to suicidal ideas. She does not contemplate harming herself. She has not already injured self. Back Pain  This is a chronic problem. The current episode started more than 1 year ago. The problem occurs intermittently. The pain is present in the lumbar spine. The quality of the pain is described as aching. The pain does not radiate. The pain is moderate. The symptoms are aggravated by bending and twisting (lifting). Pertinent negatives include no chest pain. Risk factors include poor posture and sedentary lifestyle. She has tried ice, heat and home exercises for the symptoms. The treatment provided moderate relief. Patient's medications, allergies, past medical,surgical, social and family histories were reviewed and updated as appropriate.      Past Medical History:   Diagnosis Date    Anxiety     Hyperlipidemia      Past Surgical History:   Procedure Laterality Date    COLONOSCOPY N/A 2019    COLONOSCOPY performed by Cary Booth MD at [facility]    HAND SURGERY Left 2020    OPEN REDUCTION INTERNAL FIXATION LEFT THUMB CASTAÑEDA'S FRACTURE performed by Isabel Perez MD at [address]     Family History   Problem Relation Age of Onset    High Cholesterol Mother     Other Father         colon polyps    Colon Cancer Father     Dementia Father     Other Sister         gerd     Social History     Tobacco Use    Smoking status: Former     Packs/day: 1.00     Years: 10.00     Pack years: 10.00     Types: Cigarettes     Quit date: 1983     Years since quittin.1    Smokeless tobacco: Never   Substance Use Topics    Alcohol use: Yes     Alcohol/week: 0.0 standard drinks     Comment: 0-4 glasses of wine per month      Allergies   Allergen Reactions    Pneumococcal Vaccines Other (See Comments)     Large local reaction    Prednisone Palpitations     dizziness     No current outpatient medications on file prior to visit. No current facility-administered medications on file prior to visit. Review of Systems   Constitutional:  Negative for appetite change and fatigue. HENT:  Negative for congestion, postnasal drip and rhinorrhea. Respiratory:  Negative for shortness of breath. Cardiovascular:  Negative for chest pain. Gastrointestinal:  Positive for constipation (occ'l). Negative for diarrhea. Genitourinary:  Negative for difficulty urinating. Musculoskeletal:  Positive for back pain. Psychiatric/Behavioral:  Negative for dysphoric mood and sleep disturbance. The patient is not nervous/anxious and does not have insomnia. OBJECTIVE:    /80   Temp 98.1 °F (36.7 °C)   Ht 5' 6.5\" (1.689 m)   Wt 176 lb (79.8 kg)   BMI 27.98 kg/m²    Physical Exam  Constitutional:       General: She is not in acute distress. Appearance: Normal appearance. She is well-developed.    HENT:      Head: Normocephalic and atraumatic. Right Ear: Tympanic membrane and external ear normal.      Left Ear: Tympanic membrane and external ear normal.      Nose: Nose normal.      Mouth/Throat:      Mouth: Mucous membranes are moist.      Pharynx: No posterior oropharyngeal erythema. Eyes:      General:         Right eye: No discharge. Conjunctiva/sclera: Conjunctivae normal.   Neck:      Thyroid: No thyromegaly. Vascular: No JVD. Trachea: No tracheal deviation. Cardiovascular:      Rate and Rhythm: Normal rate and regular rhythm. Heart sounds: Normal heart sounds. Pulmonary:      Effort: Pulmonary effort is normal. No respiratory distress. Breath sounds: Normal breath sounds. No rales. Abdominal:      General: Bowel sounds are normal. There is no distension. Palpations: Abdomen is soft. There is no mass. Tenderness: There is no abdominal tenderness. There is no guarding or rebound. Musculoskeletal:      Cervical back: Normal range of motion and neck supple. Right lower leg: No edema. Left lower leg: No edema. Lymphadenopathy:      Cervical: No cervical adenopathy. Skin:     General: Skin is warm and dry. Neurological:      Mental Status: She is alert and oriented to person, place, and time. Psychiatric:         Mood and Affect: Mood normal.         Behavior: Behavior normal.       ASSESSMENT/PLAN:    Carlos Hodges was seen today for medicare aw. Diagnoses and all orders for this visit:    Encounter for screening mammogram for malignant neoplasm of breast  -     ADDISON DIGITAL SCREEN W OR WO CAD BILATERAL; Future    Mixed hyperlipidemia  LDL is near goal of less than 100 with a value of 106. Her HDL is excellent at 61  -     pravastatin (PRAVACHOL) 40 MG tablet; One daily  -     Comprehensive Metabolic Panel, Fasting; Future  -     CBC with Auto Differential; Future  -     Lipid, Fasting; Future  -     TSH with Reflex;  Future    Depression, unspecified depression type  Symptoms are under excellent control and patient wants to continue the citalopram at her current dose. -     citalopram (CELEXA) 20 MG tablet; TAKE ONE TABLET BY MOUTH DAILY    Medicare annual wellness visit, subsequent  AWV done today    Return in 1 year (on 1/27/2024) for Medicare Annual Wellness Visit in 1 year. Please note portions of this note were completed with a voicerecognition program.  Efforts were made to edit the dictations but occasionally words are mis-transcribed. Medicare Annual Wellness Visit    Elie Lucero is here for Medicare AWV (AWV, discuss lab. )    Assessment & Plan   Encounter for screening mammogram for malignant neoplasm of breast  -     ADDISON DIGITAL SCREEN W OR WO CAD BILATERAL; Future  Mixed hyperlipidemia  -     pravastatin (PRAVACHOL) 40 MG tablet; One daily, Disp-90 tablet, R-3Normal  -     Comprehensive Metabolic Panel, Fasting; Future  -     CBC with Auto Differential; Future  -     Lipid, Fasting; Future  -     TSH with Reflex; Future  Depression, unspecified depression type  -     citalopram (CELEXA) 20 MG tablet; TAKE ONE TABLET BY MOUTH DAILY, Disp-90 tablet, R-3Normal  Medicare annual wellness visit, subsequent    Recommendations for Preventive Services Due: see orders and patient instructions/AVS.  Recommended screening schedule for the next 5-10 years is provided to the patient in written form: see Patient Instructions/AVS.     Return in 1 year (on 1/27/2024) for Medicare Annual Wellness Visit in 1 year. Subjective       Patient's complete Health Risk Assessment and screening values have been reviewed and are found in Flowsheets. The following problems were reviewed today and where indicated follow up appointments were made and/or referrals ordered.     Positive Risk Factor Screenings with Interventions:         Alcohol Screening:  Alcohol Use: Heavy Drinker    Frequency of Alcohol Consumption: 2-4 times a month    Average Number of Drinks: 1 or 2    Frequency of Binge Drinking: Less than monthly      AUDIT-C Score: 3   AUDIT Total Score: 3    Interpretation of AUDIT-C score:   3-7 indicates potential alcohol risk. 8 or more is associated with harmful or hazardous drinking. 15 or more in women, and 15 or more in men, is likely to indicate alcohol dependence. Interventions:  Rare intake. No intervention indicated               Weight and Activity:  Physical Activity: Insufficiently Active    Days of Exercise per Week: 3 days    Minutes of Exercise per Session: 40 min     On average, how many days per week do you engage in moderate to strenuous exercise (like a brisk walk)?: 3 days  Have you lost any weight without trying in the past 3 months?: No  Body mass index: (!) 27.98      Dentist Screen:  Have you seen the dentist within the past year?: (!) No    Intervention:  Advised to schedule with their dentist      Safety:  Do you have either shower bars, grab bars, non-slip mats or non-slip surfaces in your shower or bathtub?: (!) No  Do all of your stairways have a railing or banister?: (!) No  Interventions:  Patient states that her service in her shower is nonslip  She does not have any stairways. There are 2 steps to the outside and this does not require a banister or railing. Objective   Vitals:    01/27/23 1543   BP: 116/80   Temp: 98.1 °F (36.7 °C)   Weight: 176 lb (79.8 kg)   Height: 5' 6.5\" (1.689 m)      Body mass index is 27.98 kg/m². Allergies   Allergen Reactions    Pneumococcal Vaccines Other (See Comments)     Large local reaction    Prednisone Palpitations     dizziness     Prior to Visit Medications    Medication Sig Taking?  Authorizing Provider   pravastatin (PRAVACHOL) 40 MG tablet One daily Yes Addis Goldman MD   citalopram (CELEXA) 20 MG tablet TAKE ONE TABLET BY MOUTH DAILY Yes Addis Goldman MD       [facility] (Including outside providers/suppliers regularly involved in providing care):   Patient Care Team:  Kimberly Chase MD as PCP - General (Family Medicine)  Kimberly Chase MD as PCP - [facility] Empaneled Provider  Kajal Pang MD as Consulting Physician (Obstetrics & Gynecology)     Reviewed and updated this visit:  Tobacco  Allergies  Meds  Med Hx  Surg Hx  Soc Hx  Fam Hx

## 2023-01-27 NOTE — PATIENT INSTRUCTIONS
Substance Use Disorder: Care Instructions  Overview     You can improve your life and health by stopping your use of alcohol or drugs. When you don't drink or use drugs, you may feel and sleep better. You may get along better with your family, friends, and coworkers. There are medicines and programs that can help with substance use disorder. How can you care for yourself at home? If you have been given medicine to help keep you sober or reduce your cravings, be sure to take it exactly as prescribed. Talk to your doctor about programs that can help you stop using drugs or drinking alcohol. Do not tempt yourself by keeping alcohol or drugs in your home. Learn how to say no when other people drink or use drugs. Or don't spend time with people who drink or use drugs. Use the time and money spent on drinking or drugs to do something fun with your family or friends. Preventing a relapse  Do not drink alcohol or use drugs at all. Using any amount of alcohol or drugs greatly increases your risk for relapse. Seek help from organizations such as Alcoholics Anonymous, Narcotics Anonymous, or treatment facilities if you feel the need to drink alcohol or use drugs again. Remember that recovery is a lifelong process. Stay away from situations, friends, or places that may lead you to drink or use drugs. Have a plan to spot and deal with relapse. Learn to recognize changes in your thinking that lead you to drink or use drugs. These are warning signs. Get help before you start to drink or use drugs again. Get help as soon as you can if you relapse. Some people make a plan with another person that outlines what they want that person to do for them if they relapse. The plan usually includes how to handle the relapse and who to notify in case of relapse. Don't give up. Remember that a relapse does not mean that you have failed. Use the experience to learn the triggers that lead you to drink or use drugs.  Then quit again. Many people have several relapses before they are able to quit for good. Follow-up care is a key part of your treatment and safety. Be sure to make and go to all appointments, and call your doctor if you are having problems. It's also a good idea to know your test results and keep a list of the medicines you take. When should you call for help? Call 911  anytime you think you may need emergency care. For example, call if:    You feel you cannot stop from hurting yourself or someone else. Call your doctor now or seek immediate medical care if:    You have serious withdrawal symptoms, such as confusion, hallucinations, severe trembling, or seizures. Watch closely for changes in your health, and be sure to contact your doctor if:    You have a relapse.     You need more help or support to stop. Where can you learn more? Go to http://[place].rojas.com/ and enter H573 to learn more about \"Substance Use Disorder: Care Instructions. \"  Current as of: August 2, 2022               Content Version: 13.5  © 2006-2022 Healthwise, [employer]. Care instructions adapted under license by [facility] ([facility]). If you have questions about a medical condition or this instruction, always ask your healthcare professional. [place] [address] any warranty or liability for your use of this information. Learning About Emotional Support  When do you need emotional support? You might find getting support from others helpful when you have a long-term health problem. Often people feel alone, confused, or scared when coping with an illness. But you aren't alone. Other people are going through the same thing you are and know how you feel. Talking with others about your feelings can help you feel better. Your family and friends can give you support. So can your doctor, a support group, or a Mandaeism. If you have a support network, you will not feel as alone.  You will learn new ways to deal with your situation, and you may try harder to overcome it. Where you can get support  Family and friends: They can help you cope by giving you comfort and encouragement. Counseling: Professional counseling can help you cope with situations that interfere with your life and cause stress. Counseling can help you understand and deal with your illness. Your doctor: Find a doctor you trust and feel comfortable with. Be open and honest about your fears and concerns. Your doctor can help you get the right medical treatments, including counseling. Spiritual or Christian groups: They can provide comfort and may be able to help you find counseling or other social support services. Social groups: They can help you meet new people and get involved in activities you enjoy. Community support groups: In a support group, you can talk to others who have dealt with the same problems or illness as you. You can encourage one another and learn ways to cope with tough emotions. How to find a support group  Ask your doctor, counselor, or other health professional for suggestions. Contact your local Sikhism, Alevism, Congregational, or other Christian group. Ask your family and friends. Ask people who have the same health concerns. Go online. Forums and blogs let you read messages from others and leave your own messages. You can exchange stories, vent your frustrations, and ask and answer questions. Contact a city, state, or national group that provides support for your health concerns. Your library or community center may have a list of these groups. Or you can look for information online. Look for a support group that works for you. Ask yourself if you prefer structure and would like a , or if you would like a less formal group. Do you prefer face-to-face meetings? Or do you feel more secure in online chat rooms or forums?   Supportive relationships  A supportive relationship includes emotional support such as love, trust, and understanding, as well as advice and concrete help, such as help managing your time. Reach out to others  Family and friends can help you. Ask them to:  Listen to you and give you encouragement. This can keep you from feeling hopeless or alone. Help with small daily tasks or with bigger problems. A helping hand can keep you from feeling overwhelmed. Help you manage a health problem. For example, ask them to go to doctor visits with you. Your loved ones can offer support by being involved in your medical care. Respect your relationships  A good relationship is also a two-way street. You count on help from others, but they also count on you. Know your friends' limits. You don't have to see or call your friends every day. If you are going through a rough patch, ask friends if you can contact them outside of the usual boundaries. Don't always complain or talk about yourself. Know when it's time to stop talking and listen or just enjoy your friend's company. Know that good friends can be a bad influence. For example, if a friend encourages you to drink when you know it will harm you, you may want to end the friendship. Where can you learn more? Go to http://www.woods.com/ and enter G092 to learn more about \"Learning About Emotional Support. \"  Current as of: February 9, 2022               Content Version: 13.5  © 9886-5797 Healthwise, Incorporated. Care instructions adapted under license by [facility] ([facility]). If you have questions about a medical condition or this instruction, always ask your healthcare professional. [place] [address] any warranty or liability for your use of this information. Learning About Dental Care for Older Adults  Dental care for older adults: Overview  Dental care for older people is much the same as for younger adults. But older adults do have concerns that younger adults do not.  Older adults may have problems with gum disease and decay on the roots of their teeth. They may need missing teeth replaced or broken fillings fixed. Or they may have dentures that need to be cared for. Some older adults may have trouble holding a toothbrush. You can help remind the person you are caring for to brush and floss their teeth or to clean their dentures. In some cases, you may need to do the brushing and other dental care tasks. People who have trouble using their hands or who have dementia may need this extra help. How can you help with dental care? Normal dental care  To keep the teeth and gums healthy:  Brush the teeth with fluoride toothpaste twice a day--in the morning and at night--and floss at least once a day. Plaque can quickly build up on the teeth of older adults. Watch for the signs of gum disease. These signs include gums that bleed after brushing or after eating hard foods, such as apples. See a dentist regularly. Many experts recommend checkups every 6 months. Keep the dentist up to date on any new medications the person is taking. Encourage a balanced diet that includes whole grains, vegetables, and fruits, and that is low in saturated fat and sodium. Encourage the person you're caring for not to use tobacco products. They can affect dental and general health. Many older adults have a fixed income and feel that they can't afford dental care. But most towns and [facility] have programs in which dentists help older adults by lowering fees. Contact your area's public health offices or  for information about dental care in your area. Using a toothbrush  Older adults with arthritis sometimes have trouble brushing their teeth because they can't easily hold the toothbrush. Their hands and fingers may be stiff, painful, or weak. If this is the case, you can: Offer an electric toothbrush. Enlarge the handle of a non-electric toothbrush by wrapping a sponge, an elastic bandage, or adhesive tape around it.   Push the toothbrush handle through a ball made of rubber or soft foam.  Make the handle longer and thicker by taping Popsicle sticks or tongue depressors to it. You may also be able to buy special toothbrushes, toothpaste dispensers, and floss holders. Your doctor may recommend a soft-bristle toothbrush if the person you care for bleeds easily. Bleeding can happen because of a health problem or from certain medicines. A toothpaste for sensitive teeth may help if the person you care for has sensitive teeth. How do you brush and floss someone's teeth? If the person you are caring for has a hard time cleaning their teeth on their own, you may need to brush and floss their teeth for them. It may be easiest to have the person sit and face away from you, and to sit or stand behind them. That way you can steady their head against your arm as you reach around to floss and brush their teeth. Choose a place that has good lighting and is comfortable for both of you. Before you begin, gather your supplies. You will need gloves, floss, a toothbrush, and a container to hold water if you are not near a sink. Wash and dry your hands well and put on gloves. Start by flossing:  Gently work a piece of floss between each of the teeth toward the gums. A plastic flossing tool may make this easier, and they are available at most [facility]. Curve the floss around each tooth into a U-shape and gently slide it under the gum line. Move the floss firmly up and down several times to scrape off the plaque. After you've finished flossing, throw away the used floss and begin brushing:  Wet the brush and apply toothpaste. Place the brush at a 45-degree angle where the teeth meet the gums. Press firmly, and move the brush in small circles over the surface of the teeth. Be careful not to brush too hard. Vigorous brushing can make the gums pull away from the teeth and can scratch the tooth enamel.   Brush all surfaces of the teeth, on the tongue side and on the cheek side. Pay special attention to the front teeth and all surfaces of the back teeth. Brush chewing surfaces with short back-and-forth strokes. After you've finished, help the person rinse the remaining toothpaste from their mouth. Where can you learn more? Go to http://www.woods.com/ and enter F944 to learn more about \"Learning About Dental Care for Older Adults. \"  Current as of: June 16, 2022               Content Version: 13.5  © 0410-0130 Healthwise, [employer]. Care instructions adapted under license by [facility] ([facility]). If you have questions about a medical condition or this instruction, always ask your healthcare professional. [place] [address] any warranty or liability for your use of this information. Advance Directives: Care Instructions  Overview  An advance directive is a legal way to state your wishes at the end of your life. It tells your family and your doctor what to do if you can't say what you want. There are two main types of advance directives. You can change them any time your wishes change. Living will. This form tells your family and your doctor your wishes about life support and other treatment. The form is also called a declaration. Medical power of . This form lets you name a person to make treatment decisions for you when you can't speak for yourself. This person is called a health care agent (health care proxy, health care surrogate). The form is also called a durable power of  for health care. If you do not have an advance directive, decisions about your medical care may be made by a family member, or by a doctor or a  who doesn't know you. It may help to think of an advance directive as a gift to the people who care for you. If you have one, they won't have to make tough decisions by themselves.   For more information, including forms for your state, see the [address] website (www.caringinfo.org/planning/advance-directives/). Follow-up care is a key part of your treatment and safety. Be sure to make and go to all appointments, and call your doctor if you are having problems. It's also a good idea to know your test results and keep a list of the medicines you take. What should you include in an advance directive? Many states have a unique advance directive form. (It may ask you to address specific issues.) Or you might use a universal form that's approved by many states. If your form doesn't tell you what to address, it may be hard to know what to include in your advance directive. Use the questions below to help you get started. Who do you want to make decisions about your medical care if you are not able to? What life-support measures do you want if you have a serious illness that gets worse over time or can't be cured? What are you most afraid of that might happen? (Maybe you're afraid of having pain, losing your independence, or being kept alive by machines.)  Where would you prefer to die? (Your home? A hospital? A nursing home?)  Do you want to donate your organs when you die? Do you want certain Roman Catholic practices performed before you die? When should you call for help? Be sure to contact your doctor if you have any questions. Where can you learn more? Go to http://www.rojas.com/ and enter R264 to learn more about \"Advance Directives: Care Instructions. \"  Current as of: June 16, 2022               Content Version: 13.5  © 0378-4377 Healthwise, Incorporated. Care instructions adapted under license by [facility] ([facility]). If you have questions about a medical condition or this instruction, always ask your healthcare professional. [address] any warranty or liability for your use of this information. Personalized Preventive Plan for Christine Mater - 1/27/2023  Medicare offers a range of preventive health benefits.  Some of the tests and screenings are paid in full while other may be subject to a deductible, co-insurance, and/or copay. Some of these benefits include a comprehensive review of your medical history including lifestyle, illnesses that may run in your family, and various assessments and screenings as appropriate. After reviewing your medical record and screening and assessments performed today your provider may have ordered immunizations, labs, imaging, and/or referrals for you. A list of these orders (if applicable) as well as your Preventive Care list are included within your After Visit Summary for your review. Other Preventive Recommendations:    A preventive eye exam performed by an eye specialist is recommended every 1-2 years to screen for glaucoma; cataracts, macular degeneration, and other eye disorders. A preventive dental visit is recommended every 6 months. Try to get at least 150 minutes of exercise per week or 10,000 steps per day on a pedometer . Order or download the FREE \"Exercise & Physical Activity: Your Everyday Guide\" from The [employer]. Call 4-573.997.2673 or search The [employer] online. You need 1900-3130 mg of calcium and 8613-4188 IU of vitamin D per day. It is possible to meet your calcium requirement with diet alone, but a vitamin D supplement is usually necessary to meet this goal.  When exposed to the sun, use a sunscreen that protects against both UVA and UVB radiation with an SPF of 30 or greater. Reapply every 2 to 3 hours or after sweating, drying off with a towel, or swimming. Always wear a seat belt when traveling in a car. Always wear a helmet when riding a bicycle or motorcycle.

## 2023-09-20 ENCOUNTER — TELEPHONE (OUTPATIENT)
Dept: FAMILY MEDICINE CLINIC | Age: 73
End: 2023-09-20

## 2023-09-20 NOTE — TELEPHONE ENCOUNTER
Called and left message for patient to return call to office. Patient has [employer]- per insurance they would like patient to be seen twice a year due to diagnosis. If patient returns call to office please schedule for follow up with PCP.

## 2023-09-20 NOTE — TELEPHONE ENCOUNTER
Called Pt, no answer, LVM. Yazmin Montana is requesting that Pt is seen again this year for chronic condition hyperlipidemia.

## 2023-09-25 ENCOUNTER — TELEPHONE (OUTPATIENT)
Dept: FAMILY MEDICINE CLINIC | Age: 73
End: 2023-09-25

## 2024-01-30 SDOH — HEALTH STABILITY: PHYSICAL HEALTH: ON AVERAGE, HOW MANY DAYS PER WEEK DO YOU ENGAGE IN MODERATE TO STRENUOUS EXERCISE (LIKE A BRISK WALK)?: 2 DAYS

## 2024-01-30 SDOH — HEALTH STABILITY: PHYSICAL HEALTH: ON AVERAGE, HOW MANY MINUTES DO YOU ENGAGE IN EXERCISE AT THIS LEVEL?: 30 MIN

## 2024-01-30 ASSESSMENT — LIFESTYLE VARIABLES
HAS A RELATIVE, FRIEND, DOCTOR, OR ANOTHER HEALTH PROFESSIONAL EXPRESSED CONCERN ABOUT YOUR DRINKING OR SUGGESTED YOU CUT DOWN: NO
HOW OFTEN DURING THE LAST YEAR HAVE YOU FOUND THAT YOU WERE NOT ABLE TO STOP DRINKING ONCE YOU HAD STARTED: NEVER
HOW OFTEN DURING THE LAST YEAR HAVE YOU FAILED TO DO WHAT WAS NORMALLY EXPECTED FROM YOU BECAUSE OF DRINKING: NEVER
HOW OFTEN DO YOU HAVE SIX OR MORE DRINKS ON ONE OCCASION: 3
HOW OFTEN DURING THE LAST YEAR HAVE YOU BEEN UNABLE TO REMEMBER WHAT HAPPENED THE NIGHT BEFORE BECAUSE YOU HAD BEEN DRINKING: 0
HAVE YOU OR SOMEONE ELSE BEEN INJURED AS A RESULT OF YOUR DRINKING: NO
HOW MANY STANDARD DRINKS CONTAINING ALCOHOL DO YOU HAVE ON A TYPICAL DAY: 1 OR 2
HOW OFTEN DURING THE LAST YEAR HAVE YOU FAILED TO DO WHAT WAS NORMALLY EXPECTED FROM YOU BECAUSE OF DRINKING: 0
HAVE YOU OR SOMEONE ELSE BEEN INJURED AS A RESULT OF YOUR DRINKING: 0
HOW MANY STANDARD DRINKS CONTAINING ALCOHOL DO YOU HAVE ON A TYPICAL DAY: 1
HOW OFTEN DURING THE LAST YEAR HAVE YOU HAD A FEELING OF GUILT OR REMORSE AFTER DRINKING: NEVER
HOW OFTEN DURING THE LAST YEAR HAVE YOU BEEN UNABLE TO REMEMBER WHAT HAPPENED THE NIGHT BEFORE BECAUSE YOU HAD BEEN DRINKING: NEVER
HOW OFTEN DURING THE LAST YEAR HAVE YOU HAD A FEELING OF GUILT OR REMORSE AFTER DRINKING: 0
HOW OFTEN DO YOU HAVE A DRINK CONTAINING ALCOHOL: MONTHLY OR LESS
HOW OFTEN DURING THE LAST YEAR HAVE YOU FOUND THAT YOU WERE NOT ABLE TO STOP DRINKING ONCE YOU HAD STARTED: 0
HOW OFTEN DURING THE LAST YEAR HAVE YOU NEEDED AN ALCOHOLIC DRINK FIRST THING IN THE MORNING TO GET YOURSELF GOING AFTER A NIGHT OF HEAVY DRINKING: 0
HOW OFTEN DO YOU HAVE A DRINK CONTAINING ALCOHOL: 2
HOW OFTEN DURING THE LAST YEAR HAVE YOU NEEDED AN ALCOHOLIC DRINK FIRST THING IN THE MORNING TO GET YOURSELF GOING AFTER A NIGHT OF HEAVY DRINKING: NEVER
HAS A RELATIVE, FRIEND, DOCTOR, OR ANOTHER HEALTH PROFESSIONAL EXPRESSED CONCERN ABOUT YOUR DRINKING OR SUGGESTED YOU CUT DOWN: 0

## 2024-01-30 ASSESSMENT — PATIENT HEALTH QUESTIONNAIRE - PHQ9
1. LITTLE INTEREST OR PLEASURE IN DOING THINGS: 0
SUM OF ALL RESPONSES TO PHQ QUESTIONS 1-9: 0
SUM OF ALL RESPONSES TO PHQ QUESTIONS 1-9: 0
SUM OF ALL RESPONSES TO PHQ9 QUESTIONS 1 & 2: 0
SUM OF ALL RESPONSES TO PHQ QUESTIONS 1-9: 0
2. FEELING DOWN, DEPRESSED OR HOPELESS: 0
SUM OF ALL RESPONSES TO PHQ QUESTIONS 1-9: 0

## 2024-02-04 SDOH — ECONOMIC STABILITY: TRANSPORTATION INSECURITY
IN THE PAST 12 MONTHS, HAS LACK OF TRANSPORTATION KEPT YOU FROM MEETINGS, WORK, OR FROM GETTING THINGS NEEDED FOR DAILY LIVING?: PATIENT DECLINED

## 2024-02-04 SDOH — ECONOMIC STABILITY: FOOD INSECURITY: WITHIN THE PAST 12 MONTHS, YOU WORRIED THAT YOUR FOOD WOULD RUN OUT BEFORE YOU GOT MONEY TO BUY MORE.: PATIENT DECLINED

## 2024-02-04 SDOH — ECONOMIC STABILITY: INCOME INSECURITY: HOW HARD IS IT FOR YOU TO PAY FOR THE VERY BASICS LIKE FOOD, HOUSING, MEDICAL CARE, AND HEATING?: PATIENT DECLINED

## 2024-02-04 SDOH — ECONOMIC STABILITY: HOUSING INSECURITY
IN THE LAST 12 MONTHS, WAS THERE A TIME WHEN YOU DID NOT HAVE A STEADY PLACE TO SLEEP OR SLEPT IN A SHELTER (INCLUDING NOW)?: PATIENT DECLINED

## 2024-02-04 SDOH — ECONOMIC STABILITY: FOOD INSECURITY: WITHIN THE PAST 12 MONTHS, THE FOOD YOU BOUGHT JUST DIDN'T LAST AND YOU DIDN'T HAVE MONEY TO GET MORE.: PATIENT DECLINED

## 2024-02-05 DIAGNOSIS — Z13.6 ENCOUNTER FOR SCREENING FOR CARDIOVASCULAR DISORDERS: ICD-10-CM

## 2024-02-05 DIAGNOSIS — Z00.00 MEDICARE ANNUAL WELLNESS VISIT, SUBSEQUENT: ICD-10-CM

## 2024-02-05 DIAGNOSIS — E78.2 MIXED HYPERLIPIDEMIA: ICD-10-CM

## 2024-02-05 DIAGNOSIS — Z13.1 SCREENING FOR DIABETES MELLITUS: ICD-10-CM

## 2024-02-05 DIAGNOSIS — Z13.0 SCREENING FOR DEFICIENCY ANEMIA: ICD-10-CM

## 2024-02-05 LAB
ALBUMIN SERPL-MCNC: 4.4 G/DL (ref 3.4–5)
ALBUMIN/GLOB SERPL: 2 {RATIO} (ref 1.1–2.2)
ALP SERPL-CCNC: 73 U/L (ref 40–129)
ALT SERPL-CCNC: 12 U/L (ref 10–40)
ANION GAP SERPL CALCULATED.3IONS-SCNC: 11 MMOL/L (ref 3–16)
AST SERPL-CCNC: 14 U/L (ref 15–37)
BASOPHILS # BLD: 0 K/UL (ref 0–0.2)
BASOPHILS NFR BLD: 0.8 %
BILIRUB SERPL-MCNC: 0.4 MG/DL (ref 0–1)
BUN SERPL-MCNC: 13 MG/DL (ref 7–20)
CALCIUM SERPL-MCNC: 8.9 MG/DL (ref 8.3–10.6)
CHLORIDE SERPL-SCNC: 106 MMOL/L (ref 99–110)
CHOLEST SERPL-MCNC: 175 MG/DL (ref 0–199)
CO2 SERPL-SCNC: 25 MMOL/L (ref 21–32)
CREAT SERPL-MCNC: 0.7 MG/DL (ref 0.6–1.2)
DEPRECATED RDW RBC AUTO: 13.2 % (ref 12.4–15.4)
EOSINOPHIL # BLD: 0.2 K/UL (ref 0–0.6)
EOSINOPHIL NFR BLD: 3 %
GFR SERPLBLD CREATININE-BSD FMLA CKD-EPI: >60 ML/MIN/{1.73_M2}
GLUCOSE P FAST SERPL-MCNC: 80 MG/DL (ref 70–99)
HCT VFR BLD AUTO: 40.4 % (ref 36–48)
HDLC SERPL-MCNC: 55 MG/DL (ref 40–60)
HGB BLD-MCNC: 13.7 G/DL (ref 12–16)
LDL CHOLESTEROL CALCULATED: 92 MG/DL
LYMPHOCYTES # BLD: 1.8 K/UL (ref 1–5.1)
LYMPHOCYTES NFR BLD: 33.9 %
MCH RBC QN AUTO: 31.4 PG (ref 26–34)
MCHC RBC AUTO-ENTMCNC: 33.9 G/DL (ref 31–36)
MCV RBC AUTO: 92.8 FL (ref 80–100)
MONOCYTES # BLD: 0.4 K/UL (ref 0–1.3)
MONOCYTES NFR BLD: 6.9 %
NEUTROPHILS # BLD: 3 K/UL (ref 1.7–7.7)
NEUTROPHILS NFR BLD: 55.4 %
PLATELET # BLD AUTO: 314 K/UL (ref 135–450)
PMV BLD AUTO: 8.3 FL (ref 5–10.5)
POTASSIUM SERPL-SCNC: 5.5 MMOL/L (ref 3.5–5.1)
PROT SERPL-MCNC: 6.6 G/DL (ref 6.4–8.2)
RBC # BLD AUTO: 4.36 M/UL (ref 4–5.2)
SODIUM SERPL-SCNC: 142 MMOL/L (ref 136–145)
TRIGL SERPL-MCNC: 139 MG/DL (ref 0–150)
TSH SERPL DL<=0.005 MIU/L-ACNC: 2.95 UIU/ML (ref 0.27–4.2)
VLDLC SERPL CALC-MCNC: 28 MG/DL
WBC # BLD AUTO: 5.4 K/UL (ref 4–11)

## 2024-02-05 NOTE — PROGRESS NOTES
SUBJECTIVE:    Clare Srivastava is a 73 y.o. female who presents for a follow up visit.    Chief Complaint   Patient presents with    Medicare AWV     Discuss labs.        Hyperlipidemia  This is a chronic problem. The current episode started more than 1 year ago. Lipid results: Total cholesterol 175, triglycerides 139, HDL 55, LDL 92. Pertinent negatives include no chest pain or shortness of breath. Current antihyperlipidemic treatment includes statins. The current treatment provides significant improvement of lipids. Compliance problems include adherence to exercise and adherence to diet.  Risk factors for coronary artery disease include a sedentary lifestyle and post-menopausal.   Anxiety  Presents for follow-up visit. Symptoms include excessive worry and nervous/anxious behavior. Patient reports no chest pain, depressed mood, dizziness, panic or shortness of breath. The severity of symptoms is mild. The patient sleeps 8 hours per night. The quality of sleep is good. Nighttime awakenings: one to two.            Patient's medications, allergies, past medical,surgical, social and family histories were reviewed and updated as appropriate.     Past Medical History:   Diagnosis Date    Anxiety     Hyperlipidemia      Past Surgical History:   Procedure Laterality Date    COLONOSCOPY N/A 4/4/2019    COLONOSCOPY performed by Mikaela Mckinney MD at [facility] ENDOSCOPY    HAND SURGERY Left 7/28/2020    OPEN REDUCTION INTERNAL FIXATION LEFT THUMB CASTAÑEDA'S FRACTURE performed by Roly Langston MD at [facility] OR     Family History   Problem Relation Age of Onset    High Cholesterol Mother     Other Father         colon polyps    Colon Cancer Father     Dementia Father     Other Sister         gerd     Social History     Tobacco Use    Smoking status: Former     Current packs/day: 0.00     Average packs/day: 1 pack/day for 10.0 years (10.0 ttl pk-yrs)     Types: Cigarettes     Start date: 12/16/1973     Quit date: 12/16/1983

## 2024-02-06 ENCOUNTER — OFFICE VISIT (OUTPATIENT)
Dept: FAMILY MEDICINE CLINIC | Age: 74
End: 2024-02-06
Payer: MEDICARE

## 2024-02-06 VITALS
TEMPERATURE: 97.9 F | SYSTOLIC BLOOD PRESSURE: 120 MMHG | OXYGEN SATURATION: 99 % | RESPIRATION RATE: 20 BRPM | WEIGHT: 183.6 LBS | BODY MASS INDEX: 28.82 KG/M2 | HEART RATE: 70 BPM | DIASTOLIC BLOOD PRESSURE: 78 MMHG | HEIGHT: 67 IN

## 2024-02-06 DIAGNOSIS — F32.A DEPRESSION, UNSPECIFIED DEPRESSION TYPE: ICD-10-CM

## 2024-02-06 DIAGNOSIS — E78.2 MIXED HYPERLIPIDEMIA: ICD-10-CM

## 2024-02-06 DIAGNOSIS — E87.5 HYPERKALEMIA: Primary | ICD-10-CM

## 2024-02-06 PROCEDURE — 1123F ACP DISCUSS/DSCN MKR DOCD: CPT | Performed by: FAMILY MEDICINE

## 2024-02-06 PROCEDURE — 99214 OFFICE O/P EST MOD 30 MIN: CPT | Performed by: FAMILY MEDICINE

## 2024-02-06 RX ORDER — PRAVASTATIN SODIUM 40 MG
TABLET ORAL
Qty: 90 TABLET | Refills: 3 | Status: SHIPPED | OUTPATIENT
Start: 2024-02-06

## 2024-02-06 RX ORDER — CITALOPRAM 20 MG/1
TABLET ORAL
Qty: 90 TABLET | Refills: 3 | Status: SHIPPED | OUTPATIENT
Start: 2024-02-06

## 2024-02-06 ASSESSMENT — PATIENT HEALTH QUESTIONNAIRE - PHQ9
6. FEELING BAD ABOUT YOURSELF - OR THAT YOU ARE A FAILURE OR HAVE LET YOURSELF OR YOUR FAMILY DOWN: 0
9. THOUGHTS THAT YOU WOULD BE BETTER OFF DEAD, OR OF HURTING YOURSELF: 0
3. TROUBLE FALLING OR STAYING ASLEEP: 0
SUM OF ALL RESPONSES TO PHQ QUESTIONS 1-9: 0
8. MOVING OR SPEAKING SO SLOWLY THAT OTHER PEOPLE COULD HAVE NOTICED. OR THE OPPOSITE, BEING SO FIGETY OR RESTLESS THAT YOU HAVE BEEN MOVING AROUND A LOT MORE THAN USUAL: 0
4. FEELING TIRED OR HAVING LITTLE ENERGY: 0
5. POOR APPETITE OR OVEREATING: 0
10. IF YOU CHECKED OFF ANY PROBLEMS, HOW DIFFICULT HAVE THESE PROBLEMS MADE IT FOR YOU TO DO YOUR WORK, TAKE CARE OF THINGS AT HOME, OR GET ALONG WITH OTHER PEOPLE: 0
SUM OF ALL RESPONSES TO PHQ QUESTIONS 1-9: 0
1. LITTLE INTEREST OR PLEASURE IN DOING THINGS: 0
SUM OF ALL RESPONSES TO PHQ QUESTIONS 1-9: 0
SUM OF ALL RESPONSES TO PHQ9 QUESTIONS 1 & 2: 0
SUM OF ALL RESPONSES TO PHQ QUESTIONS 1-9: 0
2. FEELING DOWN, DEPRESSED OR HOPELESS: 0
7. TROUBLE CONCENTRATING ON THINGS, SUCH AS READING THE NEWSPAPER OR WATCHING TELEVISION: 0

## 2024-02-06 ASSESSMENT — ENCOUNTER SYMPTOMS
RHINORRHEA: 0
CHEST TIGHTNESS: 0
DIARRHEA: 0
SHORTNESS OF BREATH: 0
CONSTIPATION: 0
BACK PAIN: 1

## 2024-02-08 DIAGNOSIS — E87.5 HYPERKALEMIA: ICD-10-CM

## 2024-02-08 LAB — POTASSIUM SERPL-SCNC: 4.5 MMOL/L (ref 3.5–5.1)

## 2024-02-12 ENCOUNTER — HOSPITAL ENCOUNTER (OUTPATIENT)
Dept: MAMMOGRAPHY | Age: 74
Discharge: HOME | End: 2024-02-16
Payer: MEDICARE

## 2024-02-12 DIAGNOSIS — Z12.31 VISIT FOR SCREENING MAMMOGRAM: ICD-10-CM

## 2024-02-12 PROCEDURE — 77063 BREAST TOMOSYNTHESIS BI: CPT

## 2024-12-06 DIAGNOSIS — R05.2 SUBACUTE COUGH: Primary | ICD-10-CM

## 2024-12-06 RX ORDER — BENZONATATE 200 MG/1
200 CAPSULE ORAL 3 TIMES DAILY PRN
Qty: 30 CAPSULE | Refills: 0 | Status: SHIPPED | OUTPATIENT
Start: 2024-12-06 | End: 2024-12-16

## 2025-02-24 DIAGNOSIS — F32.A DEPRESSION, UNSPECIFIED DEPRESSION TYPE: ICD-10-CM

## 2025-02-24 DIAGNOSIS — E78.2 MIXED HYPERLIPIDEMIA: ICD-10-CM

## 2025-02-24 RX ORDER — PRAVASTATIN SODIUM 40 MG
TABLET ORAL
Qty: 90 TABLET | Refills: 0 | OUTPATIENT
Start: 2025-02-24

## 2025-02-24 RX ORDER — CITALOPRAM HYDROBROMIDE 20 MG/1
TABLET ORAL
Qty: 90 TABLET | Refills: 0 | OUTPATIENT
Start: 2025-02-24

## 2025-02-24 NOTE — TELEPHONE ENCOUNTER
Medication:   Requested Prescriptions     Pending Prescriptions Disp Refills    pravastatin (PRAVACHOL) 40 MG tablet [Pharmacy Med Name: PRAVASTATIN SODIUM 40 MG TAB] 90 tablet 3     Sig: TAKE 1 TABLET BY MOUTH DAILY    citalopram (CELEXA) 20 MG tablet [Pharmacy Med Name: CITALOPRAM HBR 20 MG TABLET] 90 tablet 3     Sig: TAKE 1 TABLET BY MOUTH DAILY      Provider out of office.     Patient Phone Number: [phone] (home) [phone] (work)    Last appt: 2/6/2024   Next appt: Visit date not found    Last OARRS:        No data to display              PDMP Monitoring:    Last PDMP [place] as Reviewed ([place]):  Review User Review Instant Review Result          Preferred Pharmacy:   [id number] - [place] - [address] [phone] - F [phone]  [address] [place] [address]  Phone: [phone] Fax: [phone]

## 2025-03-15 SDOH — HEALTH STABILITY: PHYSICAL HEALTH: ON AVERAGE, HOW MANY DAYS PER WEEK DO YOU ENGAGE IN MODERATE TO STRENUOUS EXERCISE (LIKE A BRISK WALK)?: 2 DAYS

## 2025-03-15 SDOH — HEALTH STABILITY: PHYSICAL HEALTH: ON AVERAGE, HOW MANY MINUTES DO YOU ENGAGE IN EXERCISE AT THIS LEVEL?: 30 MIN

## 2025-03-15 ASSESSMENT — LIFESTYLE VARIABLES
HOW OFTEN DURING THE LAST YEAR HAVE YOU HAD A FEELING OF GUILT OR REMORSE AFTER DRINKING: NEVER
HAS A RELATIVE, FRIEND, DOCTOR, OR ANOTHER HEALTH PROFESSIONAL EXPRESSED CONCERN ABOUT YOUR DRINKING OR SUGGESTED YOU CUT DOWN: NO
HAVE YOU OR SOMEONE ELSE BEEN INJURED AS A RESULT OF YOUR DRINKING: NO
HOW OFTEN DURING THE LAST YEAR HAVE YOU NEEDED AN ALCOHOLIC DRINK FIRST THING IN THE MORNING TO GET YOURSELF GOING AFTER A NIGHT OF HEAVY DRINKING: NEVER
HOW OFTEN DURING THE LAST YEAR HAVE YOU BEEN UNABLE TO REMEMBER WHAT HAPPENED THE NIGHT BEFORE BECAUSE YOU HAD BEEN DRINKING: NEVER
HAVE YOU OR SOMEONE ELSE BEEN INJURED AS A RESULT OF YOUR DRINKING: NO
HOW OFTEN DO YOU HAVE SIX OR MORE DRINKS ON ONE OCCASION: 2
HAS A RELATIVE, FRIEND, DOCTOR, OR ANOTHER HEALTH PROFESSIONAL EXPRESSED CONCERN ABOUT YOUR DRINKING OR SUGGESTED YOU CUT DOWN: NO
HOW OFTEN DURING THE LAST YEAR HAVE YOU HAD A FEELING OF GUILT OR REMORSE AFTER DRINKING: NEVER
HOW OFTEN DURING THE LAST YEAR HAVE YOU BEEN UNABLE TO REMEMBER WHAT HAPPENED THE NIGHT BEFORE BECAUSE YOU HAD BEEN DRINKING: NEVER
HOW OFTEN DO YOU HAVE A DRINK CONTAINING ALCOHOL: 2-4 TIMES A MONTH
HOW OFTEN DURING THE LAST YEAR HAVE YOU FOUND THAT YOU WERE NOT ABLE TO STOP DRINKING ONCE YOU HAD STARTED: NEVER
HOW OFTEN DURING THE LAST YEAR HAVE YOU FOUND THAT YOU WERE NOT ABLE TO STOP DRINKING ONCE YOU HAD STARTED: NEVER
HOW OFTEN DURING THE LAST YEAR HAVE YOU FAILED TO DO WHAT WAS NORMALLY EXPECTED FROM YOU BECAUSE OF DRINKING: NEVER
HOW OFTEN DURING THE LAST YEAR HAVE YOU FAILED TO DO WHAT WAS NORMALLY EXPECTED FROM YOU BECAUSE OF DRINKING: NEVER
HOW OFTEN DO YOU HAVE A DRINK CONTAINING ALCOHOL: 3
HOW MANY STANDARD DRINKS CONTAINING ALCOHOL DO YOU HAVE ON A TYPICAL DAY: 1
HOW MANY STANDARD DRINKS CONTAINING ALCOHOL DO YOU HAVE ON A TYPICAL DAY: 1 OR 2
HOW OFTEN DURING THE LAST YEAR HAVE YOU NEEDED AN ALCOHOLIC DRINK FIRST THING IN THE MORNING TO GET YOURSELF GOING AFTER A NIGHT OF HEAVY DRINKING: NEVER

## 2025-03-15 ASSESSMENT — PATIENT HEALTH QUESTIONNAIRE - PHQ9
SUM OF ALL RESPONSES TO PHQ QUESTIONS 1-9: 1
2. FEELING DOWN, DEPRESSED OR HOPELESS: SEVERAL DAYS
1. LITTLE INTEREST OR PLEASURE IN DOING THINGS: NOT AT ALL

## 2025-03-17 DIAGNOSIS — E78.2 MIXED HYPERLIPIDEMIA: Primary | ICD-10-CM

## 2025-03-17 DIAGNOSIS — Z13.0 SCREENING FOR DEFICIENCY ANEMIA: ICD-10-CM

## 2025-03-17 DIAGNOSIS — Z13.1 SCREENING FOR DIABETES MELLITUS: ICD-10-CM

## 2025-03-17 DIAGNOSIS — E78.2 MIXED HYPERLIPIDEMIA: ICD-10-CM

## 2025-03-17 DIAGNOSIS — Z13.29 SCREENING FOR HYPOTHYROIDISM: ICD-10-CM

## 2025-03-17 LAB
ALBUMIN SERPL-MCNC: 4.4 G/DL (ref 3.4–5)
ALBUMIN/GLOB SERPL: 2 {RATIO} (ref 1.1–2.2)
ALP SERPL-CCNC: 66 U/L (ref 40–129)
ALT SERPL-CCNC: 16 U/L (ref 10–40)
ANION GAP SERPL CALCULATED.3IONS-SCNC: 13 MMOL/L (ref 3–16)
AST SERPL-CCNC: 18 U/L (ref 15–37)
BASOPHILS # BLD: 0 K/UL (ref 0–0.2)
BASOPHILS NFR BLD: 1 %
BILIRUB SERPL-MCNC: <0.2 MG/DL (ref 0–1)
BUN SERPL-MCNC: 16 MG/DL (ref 7–20)
CALCIUM SERPL-MCNC: 9.2 MG/DL (ref 8.3–10.6)
CHLORIDE SERPL-SCNC: 106 MMOL/L (ref 99–110)
CHOLEST SERPL-MCNC: 204 MG/DL (ref 0–199)
CO2 SERPL-SCNC: 24 MMOL/L (ref 21–32)
CREAT SERPL-MCNC: 0.6 MG/DL (ref 0.6–1.2)
DEPRECATED RDW RBC AUTO: 14 % (ref 12.4–15.4)
EOSINOPHIL # BLD: 0.1 K/UL (ref 0–0.6)
EOSINOPHIL NFR BLD: 3.3 %
GFR SERPLBLD CREATININE-BSD FMLA CKD-EPI: >90 ML/MIN/{1.73_M2}
GLUCOSE SERPL-MCNC: 89 MG/DL (ref 70–99)
HCT VFR BLD AUTO: 39.4 % (ref 36–48)
HDLC SERPL-MCNC: 54 MG/DL (ref 40–60)
HGB BLD-MCNC: 13.2 G/DL (ref 12–16)
LDL CHOLESTEROL: 113 MG/DL
LYMPHOCYTES # BLD: 1.5 K/UL (ref 1–5.1)
LYMPHOCYTES NFR BLD: 36.4 %
MCH RBC QN AUTO: 31.2 PG (ref 26–34)
MCHC RBC AUTO-ENTMCNC: 33.4 G/DL (ref 31–36)
MCV RBC AUTO: 93.5 FL (ref 80–100)
MONOCYTES # BLD: 0.3 K/UL (ref 0–1.3)
MONOCYTES NFR BLD: 6.7 %
NEUTROPHILS # BLD: 2.2 K/UL (ref 1.7–7.7)
NEUTROPHILS NFR BLD: 52.6 %
PLATELET # BLD AUTO: 279 K/UL (ref 135–450)
PMV BLD AUTO: 8.3 FL (ref 5–10.5)
POTASSIUM SERPL-SCNC: 4.8 MMOL/L (ref 3.5–5.1)
PROT SERPL-MCNC: 6.6 G/DL (ref 6.4–8.2)
RBC # BLD AUTO: 4.22 M/UL (ref 4–5.2)
SODIUM SERPL-SCNC: 143 MMOL/L (ref 136–145)
TRIGL SERPL-MCNC: 183 MG/DL (ref 0–150)
TSH SERPL DL<=0.005 MIU/L-ACNC: 2.27 UIU/ML (ref 0.27–4.2)
VLDLC SERPL CALC-MCNC: 37 MG/DL
WBC # BLD AUTO: 4.2 K/UL (ref 4–11)

## 2025-03-21 ENCOUNTER — OFFICE VISIT (OUTPATIENT)
Dept: FAMILY MEDICINE CLINIC | Age: 75
End: 2025-03-21

## 2025-03-21 VITALS
HEIGHT: 67 IN | OXYGEN SATURATION: 99 % | DIASTOLIC BLOOD PRESSURE: 70 MMHG | BODY MASS INDEX: 28.72 KG/M2 | HEART RATE: 92 BPM | SYSTOLIC BLOOD PRESSURE: 120 MMHG | WEIGHT: 183 LBS | TEMPERATURE: 97.7 F

## 2025-03-21 DIAGNOSIS — G89.29 CHRONIC RIGHT-SIDED LOW BACK PAIN WITH RIGHT-SIDED SCIATICA: ICD-10-CM

## 2025-03-21 DIAGNOSIS — M54.41 CHRONIC RIGHT-SIDED LOW BACK PAIN WITH RIGHT-SIDED SCIATICA: ICD-10-CM

## 2025-03-21 DIAGNOSIS — Z00.00 MEDICARE ANNUAL WELLNESS VISIT, SUBSEQUENT: Primary | ICD-10-CM

## 2025-03-21 DIAGNOSIS — E78.2 MIXED HYPERLIPIDEMIA: ICD-10-CM

## 2025-03-21 DIAGNOSIS — F32.A DEPRESSION, UNSPECIFIED DEPRESSION TYPE: ICD-10-CM

## 2025-03-21 DIAGNOSIS — L30.9 ECZEMA, UNSPECIFIED TYPE: ICD-10-CM

## 2025-03-21 RX ORDER — TRIAMCINOLONE ACETONIDE 1 MG/G
CREAM TOPICAL
Qty: 60 G | Refills: 1 | Status: SHIPPED | OUTPATIENT
Start: 2025-03-21

## 2025-03-21 RX ORDER — CITALOPRAM HYDROBROMIDE 20 MG/1
TABLET ORAL
Qty: 90 TABLET | Refills: 3 | Status: SHIPPED | OUTPATIENT
Start: 2025-03-21

## 2025-03-21 RX ORDER — PRAVASTATIN SODIUM 40 MG
TABLET ORAL
Qty: 90 TABLET | Refills: 3 | Status: SHIPPED | OUTPATIENT
Start: 2025-03-21

## 2025-03-21 SDOH — ECONOMIC STABILITY: FOOD INSECURITY: WITHIN THE PAST 12 MONTHS, YOU WORRIED THAT YOUR FOOD WOULD RUN OUT BEFORE YOU GOT MONEY TO BUY MORE.: NEVER TRUE

## 2025-03-21 SDOH — ECONOMIC STABILITY: FOOD INSECURITY: WITHIN THE PAST 12 MONTHS, THE FOOD YOU BOUGHT JUST DIDN'T LAST AND YOU DIDN'T HAVE MONEY TO GET MORE.: NEVER TRUE

## 2025-03-21 ASSESSMENT — PATIENT HEALTH QUESTIONNAIRE - PHQ9
2. FEELING DOWN, DEPRESSED OR HOPELESS: NOT AT ALL
SUM OF ALL RESPONSES TO PHQ QUESTIONS 1-9: 0
5. POOR APPETITE OR OVEREATING: NOT AT ALL
4. FEELING TIRED OR HAVING LITTLE ENERGY: NOT AT ALL
SUM OF ALL RESPONSES TO PHQ QUESTIONS 1-9: 0
3. TROUBLE FALLING OR STAYING ASLEEP: NOT AT ALL
9. THOUGHTS THAT YOU WOULD BE BETTER OFF DEAD, OR OF HURTING YOURSELF: NOT AT ALL
7. TROUBLE CONCENTRATING ON THINGS, SUCH AS READING THE NEWSPAPER OR WATCHING TELEVISION: NOT AT ALL
6. FEELING BAD ABOUT YOURSELF - OR THAT YOU ARE A FAILURE OR HAVE LET YOURSELF OR YOUR FAMILY DOWN: NOT AT ALL
8. MOVING OR SPEAKING SO SLOWLY THAT OTHER PEOPLE COULD HAVE NOTICED. OR THE OPPOSITE, BEING SO FIGETY OR RESTLESS THAT YOU HAVE BEEN MOVING AROUND A LOT MORE THAN USUAL: NOT AT ALL
SUM OF ALL RESPONSES TO PHQ QUESTIONS 1-9: 0
1. LITTLE INTEREST OR PLEASURE IN DOING THINGS: NOT AT ALL
SUM OF ALL RESPONSES TO PHQ QUESTIONS 1-9: 0
10. IF YOU CHECKED OFF ANY PROBLEMS, HOW DIFFICULT HAVE THESE PROBLEMS MADE IT FOR YOU TO DO YOUR WORK, TAKE CARE OF THINGS AT HOME, OR GET ALONG WITH OTHER PEOPLE: NOT DIFFICULT AT ALL

## 2025-03-21 ASSESSMENT — ENCOUNTER SYMPTOMS
BACK PAIN: 1
RHINORRHEA: 0
CHEST TIGHTNESS: 0
DIARRHEA: 0
SHORTNESS OF BREATH: 0
CONSTIPATION: 0
ABDOMINAL DISTENTION: 0

## 2025-03-21 NOTE — PROGRESS NOTES
Recommendations for Preventive Services Due: see orders and patient instructions/AVS.  Recommended screening schedule for the next 5-10 years is provided to the patient in written form: see Patient Instructions/AVS.     Reviewed and updated this visit:  Tobacco  Allergies  Meds  Sexual Hx

## 2025-03-21 NOTE — PATIENT INSTRUCTIONS
--Dermatologists:      [facility]    [phone]   MD Poonam Iglesias MD Emily Fisher, MD Rachel Gustin, MD Dena Elkeeb, MD      [facility] [place]:  Dr. Nico Rush  [address] [place] [address]  Phone: [phone]    [place]:  Dr. James Latham  [address]  [phone]    [place]:  Dr. SHALA Griffin  [address]  [address]  [place] [address]  Phone: [phone]    [place]  [address] [place] [address]  Phone: [phone]    [place]:  Dr. Otto Camejo  [address]  [address]  [place] [address]  Phone: [phone]    [facility]:  [address]  [place] [address]  Phone: [phone]        [facility]  [address] [place], [place] [address]  Phone: [phone]       [facility]  Dr. Earnestine Clark  [phone]  [address] [place], [place]       [facility]  Dr. Eden Abdi MD  [phone] [address]  [place] [address]      Dermatology and Surgery  Jose Manuel Ha MD  [phone] [address], [facility]  [address] [place]      [facility]  Caron Nam MD  [address] [place]  [address] [phone]      [facility]  [facility] [address], [address]  [place] [address] [phone]      [place]  Dr. Janeen Griffin  [address] [place], [place]  [phone]      Learning About Emotional Support  When do you need emotional support?     You might find getting support from others helpful when you have a long-term health problem. Often people feel alone, confused, or scared when coping with an illness. But you aren't

## (undated) DEVICE — CATHETER IV 20GA L1.25IN PNK FEP SFTY STR HUB RADPQ DISP

## (undated) DEVICE — GAUZE,SPONGE,4"X4",16PLY,STRL,LF,10/TRAY: Brand: MEDLINE

## (undated) DEVICE — SET ADMIN PRIMING 7ML L30IN 7.35LB 20 GTT 2ND RLER CLMP

## (undated) DEVICE — GOWN,SIRUS,POLYRNF,SETINSLV,L,20/CS: Brand: MEDLINE

## (undated) DEVICE — GLOVE,SURG,SENSICARE,ALOE,LF,PF,7: Brand: MEDLINE

## (undated) DEVICE — 2.0 CORTICAL SCREW 18MM, HD6, 5/PKG
Type: IMPLANTABLE DEVICE | Site: THUMB | Status: NON-FUNCTIONAL
Brand: APTUS
Removed: 2020-07-28

## (undated) DEVICE — BANDAGE COMPR W3INXL5YD HI E BGE W/ CLP SURE-WRAP

## (undated) DEVICE — SUTURE VCRL SZ 4-0 L27IN ABSRB UD L17MM RB-1 1/2 CIR J214H

## (undated) DEVICE — SET GRAV VENT NVENT CK VLV 3 NDL FREE PRT 10 GTT

## (undated) DEVICE — 3M™ TEGADERM™ TRANSPARENT FILM DRESSING FRAME STYLE, 1624W, 2-3/8 IN X 2-3/4 IN (6 CM X 7 CM), 100/CT 4CT/CASE: Brand: 3M™ TEGADERM™

## (undated) DEVICE — DRAPE EQUIP C ARM MINI 10000100] TIDI PRODUCTS INC]

## (undated) DEVICE — GLOVE SURG SZ 75 L12IN FNGR THK94MIL STD WHT LTX FREE

## (undated) DEVICE — SOLUTION IV 1000ML LAC RINGERS PH 6.5 INJ USP VIAFLX PLAS

## (undated) DEVICE — BW-412T DISP COMBO CLEANING BRUSH: Brand: SINGLE USE COMBINATION CLEANING BRUSH

## (undated) DEVICE — Device

## (undated) DEVICE — GOWN AURORA NONREINF LG: Brand: MEDLINE INDUSTRIES, INC.

## (undated) DEVICE — SOLUTION IV IRRIG WATER 500ML POUR BRL ST 2F7113

## (undated) DEVICE — SET VLV 3 PC AWS DISPOSABLE GRDIAN SCOPEVALET

## (undated) DEVICE — TWIST DRILL Ø1.6MM X 25MM, L81MM, AO: Brand: APTUS

## (undated) DEVICE — GLOVE SURG SZ 65 L12IN FNGR THK94MIL STD WHT LTX FREE

## (undated) DEVICE — SPLINT ORTH W3XL12IN LAYERED FBRGLS FOAM PD BRTH BK MOLD

## (undated) DEVICE — K WIRE FIX L152MM DIA1.6MM S STL 2 TRCR PNT
Type: IMPLANTABLE DEVICE | Site: THUMB | Status: NON-FUNCTIONAL
Removed: 2020-07-28

## (undated) DEVICE — ZIMMER® STERILE DISPOSABLE TOURNIQUET CUFF WITH PLC, DUAL PORT, SINGLE BLADDER, 18 IN. (46 CM)

## (undated) DEVICE — PADDING CAST W4INXL4YD NONSTERILE COT RAYON MICROPLEATED

## (undated) DEVICE — PADDING CAST W2INXL4YD COT LO LINTING WYTEX

## (undated) DEVICE — PROCEDURE KIT ENDOSCP CUST

## (undated) DEVICE — SINGLE USE DEVICE INTENDED TO COVER EXPOSED ENDS OF ORTHOPEDIC PIN AND K-WIRES TO HELP PROTECT THE EXPOSED WIRE FROM SNAGGING ON CLOTHING.: Brand: OXBORO™ PIN COVER

## (undated) DEVICE — 2.0 CORTICAL SCREW 11MM, HD6, 5/PKG
Type: IMPLANTABLE DEVICE | Site: THUMB | Status: NON-FUNCTIONAL
Brand: APTUS
Removed: 2020-07-28

## (undated) DEVICE — BANDAGE COMPR W2INXL5YD HI E BGE W/ CLP SURE-WRAP

## (undated) DEVICE — SUTURE ETHLN SZ 4-0 L18IN NONABSORBABLE BLK L19MM PS-2 3/8 1667H

## (undated) DEVICE — GOWN,SIRUS,NON REINFRCD,LARGE,SET IN SL: Brand: MEDLINE